# Patient Record
Sex: FEMALE | Race: WHITE | NOT HISPANIC OR LATINO | Employment: OTHER | URBAN - METROPOLITAN AREA
[De-identification: names, ages, dates, MRNs, and addresses within clinical notes are randomized per-mention and may not be internally consistent; named-entity substitution may affect disease eponyms.]

---

## 2018-04-11 LAB
BASOPHILS # BLD AUTO: 0 X3/UL (ref 0–0.3)
BASOPHILS # BLD AUTO: 0.7 % (ref 0–2)
DEPRECATED RDW RBC AUTO: 18.6 %
EOSINOPHIL # BLD AUTO: 0.4 X3/UL (ref 0–0.5)
EOSINOPHIL NFR BLD AUTO: 7 % (ref 0–5)
HCT VFR BLD AUTO: 35.4 % (ref 37–47)
HGB BLD-MCNC: 12.2 G/DL (ref 12–16)
LYMPHOCYTES # BLD AUTO: 1 X3/UL (ref 1.2–4.2)
LYMPHOCYTES NFR BLD AUTO: 17.6 % (ref 20.5–51.1)
MCH RBC QN AUTO: 33.2 PG (ref 26–34)
MCHC RBC AUTO-ENTMCNC: 34.3 G/DL (ref 31–37)
MCV RBC AUTO: 97 FL (ref 81–99)
MONOCYTES # BLD AUTO: 0.5 X3/UL (ref 0–1)
MONOCYTES NFR BLD AUTO: 9.8 % (ref 1.7–12)
NEUTROPHILS # BLD AUTO: 3.6 X3/UL (ref 1.4–6.5)
NEUTS SEG NFR BLD AUTO: 64.9 % (ref 42.2–75.2)
PLATELET # BLD AUTO: 216 X3/UL (ref 130–400)
PMV BLD AUTO: 10.4 FL
RBC # BLD AUTO: 3.66 X6/UL (ref 3.9–5.2)
WBC # BLD AUTO: 5.5 X3/UL (ref 4.8–10.8)

## 2018-04-12 LAB
ALBUMIN SERPL BCP-MCNC: 4.3 G/DL (ref 3.5–5.7)
ALP SERPL-CCNC: 70 IU/L (ref 55–165)
ALT SERPL W P-5'-P-CCNC: 8 IU/L (ref 5–26)
ANION GAP SERPL CALCULATED.3IONS-SCNC: 12.7 MM/L
AST SERPL W P-5'-P-CCNC: 15 U/L (ref 7–26)
BILIRUB SERPL-MCNC: 1.4 MG/DL (ref 0.3–1)
BILIRUBIN DIRECT (HISTORICAL): 0.1 MG/DL (ref 0–0.2)
BUN SERPL-MCNC: 22 MG/DL (ref 7–25)
CALCIUM SERPL-MCNC: 9.2 MG/DL (ref 8.6–10.5)
CHLORIDE SERPL-SCNC: 102 MM/L (ref 98–107)
CHOLEST SERPL-MCNC: 168 MG/DL (ref 0–200)
CO2 SERPL-SCNC: 29 MM/L (ref 21–31)
CREAT SERPL-MCNC: 0.6 MG/DL (ref 0.6–1.2)
EGFR (HISTORICAL): > 60 GFR
EGFR AFRICAN AMERICAN (HISTORICAL): > 60 GFR
EST. AVERAGE GLUCOSE BLD GHB EST-MCNC: 108 MG/DL
GLUCOSE (HISTORICAL): 100 MG/DL (ref 65–99)
HBA1C MFR BLD HPLC: 5.4 % (ref 4–6.2)
HDLC SERPL-MCNC: 36 MG/DL (ref 40–60)
LDLC SERPL CALC-MCNC: 113.3 MG/DL (ref 75–193)
OSMOLALITY, SERUM (HISTORICAL): 281 MOSM (ref 262–291)
POTASSIUM SERPL-SCNC: 4.7 MM/L (ref 3.5–5.5)
SODIUM SERPL-SCNC: 139 MM/L (ref 134–143)
T3 SERPL-MCNC: 78.63 NG/DL (ref 87–178)
T4 TOTAL (HISTORICAL): 10.34 UG/DL (ref 6.1–12.2)
TOTAL PROTEIN (HISTORICAL): 7.1 G/DL (ref 6.4–8.9)
TRIGL SERPL-MCNC: 94 MG/DL (ref 44–166)
TSH SERPL DL<=0.05 MIU/L-ACNC: 1.71 UIU/M (ref 0.45–5.33)
VLDL CHOLESTEROL (HISTORICAL): 19 MG/DL (ref 5–51)

## 2018-06-27 PROBLEM — E78.00 HYPERCHOLESTEROLEMIA: Status: ACTIVE | Noted: 2018-06-27

## 2018-06-27 PROBLEM — E03.9 HYPOTHYROID: Status: ACTIVE | Noted: 2018-06-27

## 2018-06-27 RX ORDER — HYDROCODONE BITARTRATE AND ACETAMINOPHEN 7.5; 325 MG/1; MG/1
1 TABLET ORAL DAILY
COMMUNITY
End: 2018-07-02 | Stop reason: SDUPTHER

## 2018-06-27 RX ORDER — LEVOTHYROXINE SODIUM 0.1 MG/1
100 TABLET ORAL DAILY
COMMUNITY
End: 2018-07-11 | Stop reason: SDUPTHER

## 2018-06-30 ENCOUNTER — HOSPITAL ENCOUNTER (EMERGENCY)
Facility: HOSPITAL | Age: 83
Discharge: HOME | End: 2018-06-30
Attending: FAMILY MEDICINE | Admitting: FAMILY MEDICINE
Payer: MEDICARE

## 2018-06-30 VITALS
HEIGHT: 61 IN | HEART RATE: 81 BPM | SYSTOLIC BLOOD PRESSURE: 154 MMHG | DIASTOLIC BLOOD PRESSURE: 75 MMHG | TEMPERATURE: 97.2 F | OXYGEN SATURATION: 94 % | RESPIRATION RATE: 20 BRPM | BODY MASS INDEX: 29.27 KG/M2 | WEIGHT: 155 LBS

## 2018-06-30 DIAGNOSIS — S01.01XA LACERATION OF SCALP, INITIAL ENCOUNTER: Primary | ICD-10-CM

## 2018-06-30 PROCEDURE — 99283 EMERGENCY DEPT VISIT LOW MDM: CPT

## 2018-06-30 PROCEDURE — 90715 TDAP VACCINE 7 YRS/> IM: CPT | Performed by: FAMILY MEDICINE

## 2018-06-30 PROCEDURE — 90471 IMMUNIZATION ADMIN: CPT

## 2018-06-30 RX ORDER — LIDOCAINE HYDROCHLORIDE 10 MG/ML
INJECTION, SOLUTION EPIDURAL; INFILTRATION; INTRACAUDAL; PERINEURAL
Status: DISCONTINUED
Start: 2018-06-30 | End: 2018-06-30 | Stop reason: HOSPADM

## 2018-06-30 RX ORDER — GINSENG 100 MG
CAPSULE ORAL
Status: COMPLETED
Start: 2018-06-30 | End: 2018-06-30

## 2018-06-30 RX ORDER — LIDOCAINE HYDROCHLORIDE 10 MG/ML
5 INJECTION, SOLUTION EPIDURAL; INFILTRATION; INTRACAUDAL; PERINEURAL ONCE
Status: COMPLETED | OUTPATIENT
Start: 2018-06-30 | End: 2018-06-30

## 2018-06-30 RX ORDER — GINSENG 100 MG
1 CAPSULE ORAL ONCE
Status: COMPLETED | OUTPATIENT
Start: 2018-06-30 | End: 2018-06-30

## 2018-06-30 RX ADMIN — Medication 1 APPLICATION: at 10:55

## 2018-06-30 RX ADMIN — TETANUS TOXOID, REDUCED DIPHTHERIA TOXOID AND ACELLULAR PERTUSSIS VACCINE, ADSORBED 0.5 ML: 5; 2.5; 8; 8; 2.5 SUSPENSION INTRAMUSCULAR at 10:14

## 2018-06-30 RX ADMIN — LIDOCAINE HYDROCHLORIDE 5 ML: 10 INJECTION, SOLUTION EPIDURAL; INFILTRATION; INTRACAUDAL; PERINEURAL at 10:19

## 2018-06-30 RX ADMIN — BACITRACIN ZINC 1 APPLICATION: 500 OINTMENT TOPICAL at 10:55

## 2018-06-30 NOTE — ED PROVIDER NOTES
History  Chief Complaint   Patient presents with    Fall    Laceration       History provided by:  EMS personnel and patient  Laceration   Location:  Head/neck  Length:  1cm   Depth:  Cutaneous  Bleeding: controlled    Time since incident:  1 hour  Laceration mechanism:  Metal edge  Pain details:     Quality:  Dull    Severity:  No pain    Timing:  Constant    Progression:  Unchanged  Foreign body present:  No foreign bodies  Relieved by:  None tried  Worsened by:  Nothing  Ineffective treatments:  None tried  Tetanus status:  Out of date  Associated symptoms: no fever, no focal weakness, no numbness, no rash, no redness, no swelling and no streaking        Prior to Admission Medications   Prescriptions Last Dose Informant Patient Reported? Taking? HYDROcodone-acetaminophen (NORCO) 7 5-325 mg per tablet 6/29/2018 at Unknown time  Yes Yes   Sig: Take 1 tablet by mouth daily   levothyroxine 100 mcg tablet 6/30/2018 at Unknown time  Yes Yes   Sig: Take 100 mcg by mouth daily      Facility-Administered Medications: None       Past Medical History:   Diagnosis Date    Hypothyroid        Past Surgical History:   Procedure Laterality Date    ANKLE SURGERY Left 11/06/2006    ORIF HIP FRACTURE Left 11/2006       Family History   Problem Relation Age of Onset    Cancer Neg Hx     Breast cancer Neg Hx      I have reviewed and agree with the history as documented  Social History   Substance Use Topics    Smoking status: Never Smoker    Smokeless tobacco: Never Used    Alcohol use No        Review of Systems   Constitutional: Negative for fever  HENT: Negative  Respiratory: Negative  Cardiovascular: Negative  Gastrointestinal: Negative  Musculoskeletal: Negative  Skin: Negative  Negative for rash  Neurological: Negative  Negative for focal weakness  Physical Exam  Physical Exam   Constitutional: She is oriented to person, place, and time  She appears well-developed and well-nourished  HENT:   Head: Normocephalic  Mouth/Throat: No oropharyngeal exudate  Right parietal area : 1cm laceration    Eyes: Conjunctivae and EOM are normal  Pupils are equal, round, and reactive to light  Right eye exhibits no discharge  Left eye exhibits no discharge  No scleral icterus  Neck: Normal range of motion  Neck supple  Cardiovascular: Normal rate and regular rhythm  Pulmonary/Chest: Effort normal and breath sounds normal  No respiratory distress  She has no wheezes  Lymphadenopathy:     She has no cervical adenopathy  Neurological: She is alert and oriented to person, place, and time  Skin: Skin is warm and dry  Capillary refill takes less than 2 seconds  Psychiatric: She has a normal mood and affect  Her behavior is normal    Nursing note and vitals reviewed  Vital Signs  ED Triage Vitals [06/30/18 1000]   Temperature Pulse Respirations Blood Pressure SpO2   98 4 °F (36 9 °C) 80 18 167/56 94 %      Temp Source Heart Rate Source Patient Position - Orthostatic VS BP Location FiO2 (%)   Tympanic Monitor Sitting Left arm --      Pain Score       No Pain           Vitals:    06/30/18 1000   BP: 167/56   Pulse: 80   Patient Position - Orthostatic VS: Sitting       Visual Acuity      ED Medications  Medications   bacitracin topical ointment 1 small application (not administered)   lidocaine (PF) (XYLOCAINE-MPF) 1 % injection 5 mL (5 mL Infiltration Given 6/30/18 1019)   tetanus-diphtheria-acellular pertussis (BOOSTRIX) IM injection 0 5 mL (0 5 mL Intramuscular Given 6/30/18 1014)       Diagnostic Studies  Results Reviewed     None                 No orders to display              Procedures  Lac Repair  Date/Time: 6/30/2018 10:14 AM  Performed by: Emma Finn  Authorized by: Emma Finn   Consent: Verbal consent obtained    Risks and benefits: risks, benefits and alternatives were discussed  Consent given by: patient  Patient understanding: patient states understanding of the procedure being performed  Patient consent: the patient's understanding of the procedure matches consent given  Procedure consent: procedure consent matches procedure scheduled  Relevant documents: relevant documents present and verified  Test results: test results available and properly labeled  Site marked: the operative site was marked  Radiology Images displayed and confirmed  If images not available, report reviewed: imaging studies available  Required items: required blood products, implants, devices, and special equipment available  Patient identity confirmed: verbally with patient  Time out: Immediately prior to procedure a "time out" was called to verify the correct patient, procedure, equipment, support staff and site/side marked as required    Body area: head/neck  Location details: scalp  Laceration length: 1 cm  Foreign bodies: no foreign bodies  Tendon involvement: none  Nerve involvement: none  Vascular damage: no  Anesthesia: local infiltration    Anesthesia:  Local Anesthetic: lidocaine 1% without epinephrine  Anesthetic total: 5 mL    Sedation:  Patient sedated: no    Wound Dehiscence:  Superficial Wound Dehiscence: simple closure      Procedure Details:  Irrigation solution: saline  Amount of cleaning: standard  Debridement: none  Degree of undermining: none  Skin closure: 3-0 nylon  Number of sutures: 5  Technique: simple  Approximation: close  Approximation difficulty: simple  Dressing: antibiotic ointment and 4x4 sterile gauze  Patient tolerance: Patient tolerated the procedure well with no immediate complications             Phone Contacts  ED Phone Contact    ED Course                               MDM  Number of Diagnoses or Management Options  Risk of Complications, Morbidity, and/or Mortality  Presenting problems: low  Diagnostic procedures: low  Management options: low    Patient Progress  Patient progress: stable    CritCare Time    Disposition  Final diagnoses:   Laceration of scalp, initial encounter Time reflects when diagnosis was documented in both MDM as applicable and the Disposition within this note     Time User Action Codes Description Comment    6/30/2018 10:15 AM Kaley Berger Add [S01 [id number]] Laceration of scalp, initial encounter       ED Disposition     ED Disposition Condition Comment    Discharge  Delbert Lack discharge to home/self care  Condition at discharge: Stable        Follow-up Information     Follow up With Specialties Details Why Contact Info       For suture removal F/u with PCP in 8 days for suture removal           Patient's Medications   Discharge Prescriptions    No medications on file     No discharge procedures on file      ED Provider  Electronically Signed by           Keith Ortega MD  06/30/18 9570

## 2018-06-30 NOTE — ED NOTES
Called placed to [facility], attendant states that he will call back with  information        Zheng Fischer RN  06/30/18 9621

## 2018-06-30 NOTE — DISCHARGE INSTRUCTIONS
Laceration   AMBULATORY CARE:   A laceration  is an injury to the skin and the soft tissue underneath it  Lacerations happen when you are cut or hit by something  They can happen anywhere on the body  Common symptoms include the following:   · Injury or wound to skin and tissue of any shape size that looks like a cut, tear, or gash    · Edges of the wound may be close together or wide apart    · Pain, bleeding, bruising, or swelling    · Numbness around the wound    · Decreased movement in an area below the wound  Seek care immediately if:   · You have heavy bleeding or bleeding that does not stop after 10 minutes of holding firm, direct pressure over the wound  · Your wound opens up  Contact your healthcare provider if:   · You have a fever or chills  · Your laceration is red, warm, or swollen  · You have red streaks on your skin coming from your wound  · You have white or yellow drainage from the wound that smells bad  · You have pain that gets worse, even after treatment  · You have questions or concerns about your condition or care  Treatment for a laceration  includes care to stop any bleeding  Your healthcare provider will stop the bleeding by applying pressure to the wound  He may need to check your wound for foreign objects and clean it to decrease the chance of infection  Your laceration may be closed with stitches, staples, tissue glue, or medical strips  Ask your healthcare provider if you need a tetanus shot  Medicines:   · Prescription pain medicine  may be given  Ask how to take this medicine safely  · Antibiotics  help treat or prevent a bacterial infection  · Take your medicine as directed  Contact your healthcare provider if you think your medicine is not helping or if you have side effects  Tell him or her if you are allergic to any medicine  Keep a list of the medicines, vitamins, and herbs you take  Include the amounts, and when and why you take them   Bring the list or the pill bottles to follow-up visits  Carry your medicine list with you in case of an emergency  Care for your wound as directed:   · Do not get your wound wet  until your healthcare provider says it is okay  Do not soak your wound in water  Do not go swimming until your healthcare provider says it is okay  Carefully wash the wound with soap and water  Gently pat the area dry or allow it to air dry  · Change your bandages  when they get wet, dirty, or after washing  Apply new, clean bandages as directed  Do not apply elastic bandages or tape too tight  Do not put powders or lotions over your incision  · Apply antibiotic ointment as directed  Your healthcare provider may give you antibiotic ointment to put over your wound if you have stitches  If you have strips of tape over your incision, let them dry up and fall off on their own  If they do not fall off within 14 days, gently remove them  If you have glue over your wound, do not remove or pick at it  If your glue comes off, do not replace it with glue that you have at home  · Check your wound every day for signs of infection such as swelling, redness, or pus  Self-care:   · Apply ice  on your wound for 15 to 20 minutes every hour or as directed  Use an ice pack, or put crushed ice in a plastic bag  Cover it with a towel  Ice helps prevent tissue damage and decreases swelling and pain  · Use a splint as directed  A splint will decrease movement and stress on your wound  It may help it heal faster  A splint may be used for lacerations over joints or areas of your body that bend  Ask your healthcare provider how to apply and remove a splint  · Decrease scarring of your wound  by applying ointments as directed  Do not apply ointments until your healthcare provider says it is okay  You may need to wait until your wound is healed  Ask which ointment to buy and how often to use it   After your wound is healed, use sunscreen over the area when you are out in the sun  You should do this for at least 6 months to 1 year after your injury  Follow up with your healthcare provider as directed:  Write down your questions so you remember to ask them during your visits  © 2017 [employer] Information is for End User's use only and may not be sold, redistributed or otherwise used for commercial purposes  All illustrations and images included in CareNotes® are the copyrighted property of [employer]  or [employer]on [employer]  The above information is an  only  It is not intended as medical advice for individual conditions or treatments  Talk to your doctor, nurse or pharmacist before following any medical regimen to see if it is safe and effective for you

## 2018-07-02 DIAGNOSIS — G89.29 CHRONIC LOW BACK PAIN WITHOUT SCIATICA, UNSPECIFIED BACK PAIN LATERALITY: Primary | ICD-10-CM

## 2018-07-02 DIAGNOSIS — M54.50 CHRONIC LOW BACK PAIN WITHOUT SCIATICA, UNSPECIFIED BACK PAIN LATERALITY: Primary | ICD-10-CM

## 2018-07-02 RX ORDER — HYDROCODONE BITARTRATE AND ACETAMINOPHEN 7.5; 325 MG/1; MG/1
1 TABLET ORAL DAILY
Qty: 30 TABLET | Refills: 0 | Status: SHIPPED | OUTPATIENT
Start: 2018-07-02 | End: 2018-10-10 | Stop reason: SDUPTHER

## 2018-07-07 ENCOUNTER — APPOINTMENT (EMERGENCY)
Dept: CT IMAGING | Facility: HOSPITAL | Age: 83
End: 2018-07-07
Payer: MEDICARE

## 2018-07-07 ENCOUNTER — HOSPITAL ENCOUNTER (EMERGENCY)
Facility: HOSPITAL | Age: 83
Discharge: HOME | End: 2018-07-07
Payer: MEDICARE

## 2018-07-07 VITALS
DIASTOLIC BLOOD PRESSURE: 65 MMHG | HEART RATE: 73 BPM | WEIGHT: 154 LBS | TEMPERATURE: 98.4 F | RESPIRATION RATE: 18 BRPM | SYSTOLIC BLOOD PRESSURE: 151 MMHG | BODY MASS INDEX: 30.23 KG/M2 | OXYGEN SATURATION: 94 % | HEIGHT: 60 IN

## 2018-07-07 DIAGNOSIS — S22.000A COMPRESSION FRACTURE OF THORACIC VERTEBRA, CLOSED, INITIAL ENCOUNTER (HCC): Primary | ICD-10-CM

## 2018-07-07 DIAGNOSIS — I10 ESSENTIAL HYPERTENSION: ICD-10-CM

## 2018-07-07 DIAGNOSIS — S39.012A LUMBAR STRAIN, INITIAL ENCOUNTER: ICD-10-CM

## 2018-07-07 LAB
ALBUMIN SERPL BCP-MCNC: 3.6 G/DL (ref 3.5–5.7)
ALP SERPL-CCNC: 55 U/L (ref 55–165)
ALT SERPL W P-5'-P-CCNC: 7 U/L (ref 7–52)
ANION GAP SERPL CALCULATED.3IONS-SCNC: 5 MMOL/L (ref 4–13)
APTT PPP: 34 SECONDS (ref 24–36)
AST SERPL W P-5'-P-CCNC: 16 U/L (ref 13–39)
BASOPHILS # BLD AUTO: 0.1 THOUSANDS/ΜL (ref 0–0.1)
BASOPHILS NFR BLD AUTO: 1 % (ref 0–2)
BILIRUB SERPL-MCNC: 1 MG/DL (ref 0.2–1)
BUN SERPL-MCNC: 14 MG/DL (ref 7–25)
CALCIUM SERPL-MCNC: 9 MG/DL (ref 8.6–10.5)
CHLORIDE SERPL-SCNC: 102 MMOL/L (ref 98–107)
CO2 SERPL-SCNC: 29 MMOL/L (ref 21–31)
CREAT SERPL-MCNC: 0.56 MG/DL (ref 0.6–1.2)
EOSINOPHIL # BLD AUTO: 0.1 THOUSAND/ΜL (ref 0–0.61)
EOSINOPHIL NFR BLD AUTO: 2 % (ref 0–5)
ERYTHROCYTE [DISTWIDTH] IN BLOOD BY AUTOMATED COUNT: 17.5 % (ref 11.5–14.5)
GFR SERPL CREATININE-BSD FRML MDRD: 79 ML/MIN/1.73SQ M
GLUCOSE SERPL-MCNC: 102 MG/DL (ref 65–99)
HCT VFR BLD AUTO: 35.2 % (ref 34.8–46.1)
HGB BLD-MCNC: 12.3 G/DL (ref 12–16)
INR PPP: 1.07 (ref 0.9–1.5)
LYMPHOCYTES # BLD AUTO: 0.8 THOUSANDS/ΜL (ref 0.6–4.47)
LYMPHOCYTES NFR BLD AUTO: 11 % (ref 21–51)
MCH RBC QN AUTO: 33.8 PG (ref 26–34)
MCHC RBC AUTO-ENTMCNC: 34.9 G/DL (ref 31–37)
MCV RBC AUTO: 97 FL (ref 81–99)
MONOCYTES # BLD AUTO: 0.7 THOUSAND/ΜL (ref 0.17–1.22)
MONOCYTES NFR BLD AUTO: 9 % (ref 2–12)
NEUTROPHILS # BLD AUTO: 6 THOUSANDS/ΜL (ref 1.4–6.5)
NEUTS SEG NFR BLD AUTO: 78 % (ref 42–75)
NRBC BLD AUTO-RTO: 0 /100 WBCS
PLATELET # BLD AUTO: 254 THOUSANDS/UL (ref 149–390)
PMV BLD AUTO: 9 FL (ref 8.6–11.7)
POTASSIUM SERPL-SCNC: 4 MMOL/L (ref 3.5–5.5)
PROT SERPL-MCNC: 6.6 G/DL (ref 6.4–8.9)
PROTHROMBIN TIME: 12.4 SECONDS (ref 10.1–12.9)
RBC # BLD AUTO: 3.62 MILLION/UL (ref 3.9–5.2)
SODIUM SERPL-SCNC: 136 MMOL/L (ref 134–143)
WBC # BLD AUTO: 7.7 THOUSAND/UL (ref 4.8–10.8)

## 2018-07-07 PROCEDURE — 72131 CT LUMBAR SPINE W/O DYE: CPT

## 2018-07-07 PROCEDURE — 71250 CT THORAX DX C-: CPT

## 2018-07-07 PROCEDURE — 99284 EMERGENCY DEPT VISIT MOD MDM: CPT

## 2018-07-07 PROCEDURE — 85610 PROTHROMBIN TIME: CPT

## 2018-07-07 PROCEDURE — 96374 THER/PROPH/DIAG INJ IV PUSH: CPT

## 2018-07-07 PROCEDURE — 85730 THROMBOPLASTIN TIME PARTIAL: CPT

## 2018-07-07 PROCEDURE — 85025 COMPLETE CBC W/AUTO DIFF WBC: CPT

## 2018-07-07 PROCEDURE — 70450 CT HEAD/BRAIN W/O DYE: CPT

## 2018-07-07 PROCEDURE — 80053 COMPREHEN METABOLIC PANEL: CPT

## 2018-07-07 PROCEDURE — 36415 COLL VENOUS BLD VENIPUNCTURE: CPT

## 2018-07-07 PROCEDURE — 93005 ELECTROCARDIOGRAM TRACING: CPT

## 2018-07-07 RX ORDER — FENTANYL CITRATE 50 UG/ML
50 INJECTION, SOLUTION INTRAMUSCULAR; INTRAVENOUS ONCE
Status: COMPLETED | OUTPATIENT
Start: 2018-07-07 | End: 2018-07-07

## 2018-07-07 RX ORDER — HYDRALAZINE HYDROCHLORIDE 20 MG/ML
10 INJECTION INTRAMUSCULAR; INTRAVENOUS ONCE
Status: DISCONTINUED | OUTPATIENT
Start: 2018-07-07 | End: 2018-07-07 | Stop reason: HOSPADM

## 2018-07-07 RX ORDER — GABAPENTIN 300 MG/1
300 CAPSULE ORAL 2 TIMES DAILY
Qty: 14 CAPSULE | Refills: 0 | Status: SHIPPED | OUTPATIENT
Start: 2018-07-07 | End: 2018-07-11 | Stop reason: SDUPTHER

## 2018-07-07 RX ADMIN — FENTANYL CITRATE 50 MCG: 50 INJECTION INTRAMUSCULAR; INTRAVENOUS at 13:42

## 2018-07-07 NOTE — DISCHARGE INSTRUCTIONS
Chronic Hypertension   AMBULATORY CARE:   Hypertension  is high blood pressure (BP)  Your BP is the force of your blood moving against the walls of your arteries  Normal BP is less than 120/80  Prehypertension is between 120/80 and 139/89  Hypertension is 140/90 or higher  Hypertension causes your BP to get so high that your heart has to work much harder than normal  This can damage your heart  Chronic hypertension is a long-term condition that you can control with a healthy lifestyle or medicines  A controlled blood pressure helps protect your organs, such as your heart, lungs, brain, and kidneys  Common symptoms include the following:   · Headache     · Blurred vision    · Chest pain     · Dizziness or weakness     · Trouble breathing     · Nosebleeds  Call 911 for any of the following:   · You have discomfort in your chest that feels like squeezing, pressure, fullness, or pain  · You become confused or have difficulty speaking  · You suddenly feel lightheaded or have trouble breathing  · You have pain or discomfort in your back, neck, jaw, stomach, or arm  Seek care immediately if:   · You have a severe headache or vision loss  · You have weakness in an arm or leg  Contact your healthcare provider if:   · You feel faint, dizzy, confused, or drowsy  · You have been taking your BP medicine and your BP is still higher than your healthcare provider says it should be  · You have questions or concerns about your condition or care  Treatment for chronic hypertension  may include medicine to lower your BP and lower your cholesterol level  A low cholesterol level helps prevent heart disease and makes it easier to control your blood pressure  Heart disease can make your blood pressure harder to control  You may also need to make lifestyle changes  Take your medicine exactly as directed    Manage chronic hypertension:  Talk with your healthcare provider about these and other ways to manage hypertension:  · Take your BP at home  Sit and rest for 5 minutes before you take your BP  Extend your arm and support it on a flat surface  Your arm should be at the same level as your heart  Follow the directions that came with your BP monitor  If possible, take at least 2 BP readings each time  Take your BP at least twice a day at the same times each day, such as morning and evening  Keep a record of your BP readings and bring it to your follow-up visits  Ask your healthcare provider what your blood pressure should be  · Limit sodium (salt) as directed  Too much sodium can affect your fluid balance  Check labels to find low-sodium or no-salt-added foods  Some low-sodium foods use potassium salts for flavor  Too much potassium can also cause health problems  Your healthcare provider will tell you how much sodium and potassium are safe for you to have in a day  He or she may recommend that you limit sodium to 2,300 mg a day  · Follow the meal plan recommended by your healthcare provider  A dietitian or your provider can give you more information on low-sodium plans or the DASH (Dietary Approaches to Stop Hypertension) eating plan  The DASH plan is low in sodium, unhealthy fats, and total fat  It is high in potassium, calcium, and fiber  · Exercise to maintain a healthy weight  Exercise at least 30 minutes per day, on most days of the week  This will help decrease your blood pressure  Ask about the best exercise plan for you  · Decrease stress  This may help lower your BP  Learn ways to relax, such as deep breathing or listening to music  · Limit alcohol  Women should limit alcohol to 1 drink a day  Men should limit alcohol to 2 drinks a day  A drink of alcohol is 12 ounces of beer, 5 ounces of wine, or 1½ ounces of liquor  · Do not smoke  Nicotine and other chemicals in cigarettes and cigars can increase your BP and also cause lung damage   Ask your healthcare provider for information if you currently smoke and need help to quit  E-cigarettes or smokeless tobacco still contain nicotine  Talk to your healthcare provider before you use these products  Follow up with your healthcare provider as directed: You will need to return to have your BP checked and to have other lab tests done  Write down your questions so you remember to ask them during your visits  © 2017 [employer] [employer] Information is for End User's use only and may not be sold, redistributed or otherwise used for commercial purposes  All illustrations and images included in CareNotes® are the copyrighted property of [employer]  or [employer]  The above information is an  only  It is not intended as medical advice for individual conditions or treatments  Talk to your doctor, nurse or pharmacist before following any medical regimen to see if it is safe and effective for you  Chronic Hypertension   WHAT YOU NEED TO KNOW:   Hypertension is high blood pressure (BP)  Your BP is the force of your blood moving against the walls of your arteries  Normal BP is less than 120/80  Prehypertension is between 120/80 and 139/89  Hypertension is 140/90 or higher  Hypertension causes your BP to get so high that your heart has to work much harder than normal  This can damage your heart  Chronic hypertension is a long-term condition that you can control with a healthy lifestyle or medicines  A controlled blood pressure helps protect your organs, such as your heart, lungs, brain, and kidneys  DISCHARGE INSTRUCTIONS:   Call 911 for any of the following:   · You have discomfort in your chest that feels like squeezing, pressure, fullness, or pain  · You become confused or have difficulty speaking  · You suddenly feel lightheaded or have trouble breathing  · You have pain or discomfort in your back, neck, jaw, stomach, or arm    Return to the emergency department if:   · You have a severe headache or vision loss  · You have weakness in an arm or leg  Contact your healthcare provider if:   · You feel faint, dizzy, confused, or drowsy  · You have been taking your BP medicine and your BP is still higher than your healthcare provider says it should be  · You have questions or concerns about your condition or care  Medicines: You may need any of the following:  · Medicine  may be used to help lower your BP  You may need more than one type of medicine  Take the medicine exactly as directed  · Diuretics  help decrease extra fluid that collects in your body  This will help lower your BP  You may urinate more often while you take this medicine  · Cholesterol medicine  helps lower your cholesterol level  A low cholesterol level helps prevent heart disease and makes it easier to control your blood pressure  · Take your medicine as directed  Contact your healthcare provider if you think your medicine is not helping or if you have side effects  Tell him or her if you are allergic to any medicine  Keep a list of the medicines, vitamins, and herbs you take  Include the amounts, and when and why you take them  Bring the list or the pill bottles to follow-up visits  Carry your medicine list with you in case of an emergency  Follow up with your healthcare provider as directed: You will need to return to have your blood pressure checked and to have other lab tests done  Write down your questions so you remember to ask them during your visits  Manage chronic hypertension:  Talk with your healthcare provider about these and other ways to manage hypertension:  · Take your BP at home  Sit and rest for 5 minutes before you take your BP  Extend your arm and support it on a flat surface  Your arm should be at the same level as your heart  Follow the directions that came with your BP monitor  If possible, take at least 2 BP readings each time   Take your BP at least twice a day at the same times each day, such as morning and evening  Keep a record of your BP readings and bring it to your follow-up visits  Ask your healthcare provider what your blood pressure should be  · Limit sodium (salt) as directed  Too much sodium can affect your fluid balance  Check labels to find low-sodium or no-salt-added foods  Some low-sodium foods use potassium salts for flavor  Too much potassium can also cause health problems  Your healthcare provider will tell you how much sodium and potassium are safe for you to have in a day  He or she may recommend that you limit sodium to 2,300 mg a day  · Follow the meal plan recommended by your healthcare provider  A dietitian or your provider can give you more information on low-sodium plans or the DASH (Dietary Approaches to Stop Hypertension) eating plan  The DASH plan is low in sodium, unhealthy fats, and total fat  It is high in potassium, calcium, and fiber  · Exercise to maintain a healthy weight  Exercise at least 30 minutes per day, on most days of the week  This will help decrease your blood pressure  Ask about the best exercise plan for you  · Decrease stress  This may help lower your BP  Learn ways to relax, such as deep breathing or listening to music  · Limit alcohol  Women should limit alcohol to 1 drink a day  Men should limit alcohol to 2 drinks a day  A drink of alcohol is 12 ounces of beer, 5 ounces of wine, or 1½ ounces of liquor  · Do not smoke  Nicotine and other chemicals in cigarettes and cigars can increase your BP and also cause lung damage  Ask your healthcare provider for information if you currently smoke and need help to quit  E-cigarettes or smokeless tobacco still contain nicotine  Talk to your healthcare provider before you use these products  © 2017 [employer] [employer] Information is for End User's use only and may not be sold, redistributed or otherwise used for commercial purposes   All illustrations and images included in CareNotes® are the copyrighted property of [employer]  or [employer]on Fisher  The above information is an  only  It is not intended as medical advice for individual conditions or treatments  Talk to your doctor, nurse or pharmacist before following any medical regimen to see if it is safe and effective for you  Low Back Strain   WHAT YOU NEED TO KNOW:   Low back strain is an injury to your lower back muscles or tendons  Tendons are strong tissues that connect muscles to bones  The lower back supports most of your body weight and helps you move, twist, and bend  DISCHARGE INSTRUCTIONS:   Return to the emergency department if:   · You hear or feel a pop in your lower back  · You have increased swelling or pain in your lower back  · You have trouble moving your legs  · Your legs are numb  Contact your healthcare provider if:   · You have a fever  · Your pain does not go away, even after treatment  · You have questions or concerns about your condition or care  Medicines: The following medicines may be ordered by your healthcare provider:  · Acetaminophen decreases pain and fever  It is available without a doctor's order  Ask how much to take and how often to take it  Follow directions  Acetaminophen can cause liver damage if not taken correctly  · NSAIDs , such as ibuprofen, help decrease swelling, pain, and fever  This medicine is available with or without a doctor's order  NSAIDs can cause stomach bleeding or kidney problems in certain people  If you take blood thinner medicine, always ask your healthcare provider if NSAIDs are safe for you  Always read the medicine label and follow directions  · Muscle relaxers  help decrease pain and muscle spasms  · Prescription pain medicine  may be given  Ask how to take this medicine safely  · Take your medicine as directed    Contact your healthcare provider if you think your medicine is not helping or if you have side effects  Tell him or her if you are allergic to any medicine  Keep a list of the medicines, vitamins, and herbs you take  Include the amounts, and when and why you take them  Bring the list or the pill bottles to follow-up visits  Carry your medicine list with you in case of an emergency  Self-care:   · Rest  as directed  You may need to rest in bed for a period of time after your injury  Do not lift heavy objects  · Apply ice  on your back for 15 to 20 minutes every hour or as directed  Use an ice pack, or put crushed ice in a plastic bag  Cover it with a towel  Ice helps prevent tissue damage and decreases swelling and pain  · Apply heat  on your lower back for 20 to 30 minutes every 2 hours for as many days as directed  Heat helps decrease pain and muscle spasms  · Slowly start to increase your activity  as the pain decreases, or as directed  Prevent another low back strain:   · Use correct body movements  ¨ Bend at the hips and knees when you  objects  Do not bend from the waist  Use your leg muscles as you lift the load  Do not use your back  Keep the object close to your chest as you lift it  Try not to twist or lift anything above your waist     ¨ Change your position often when you stand for long periods of time  Rest one foot on a small box or footrest, and then switch to the other foot often  ¨ Try not to sit for long periods of time  When you do, sit in a straight-backed chair with your feet flat on the floor  ¨ Never reach, pull, or push while you are sitting  · Warm up before you exercise  Do exercises that strengthen your back muscles  Ask your healthcare provider about the best exercise plan for you  · Maintain a healthy weight  Ask your healthcare provider how much you should weigh  Ask him to help you create a weight loss plan if you are overweight    © 2017 [address] [employer] Information is for End User's use only and may not be sold, redistributed or otherwise used for commercial purposes  All illustrations and images included in CareNotes® are the copyrighted property of [employer]  or [employer] Fisher  The above information is an  only  It is not intended as medical advice for individual conditions or treatments  Talk to your doctor, nurse or pharmacist before following any medical regimen to see if it is safe and effective for you  Low Back Strain   AMBULATORY CARE:   Low back strain  is an injury to your lower back muscles or tendons  Tendons are strong tissues that connect muscles to bones  The lower back supports most of your body weight and helps you move, twist, and bend  Low back strain is usually caused by activities that increase stress on the lower back, such as exercise or injury  Common signs and symptoms include the following:   · Low back pain or muscle spasms    · Stiffness or limited movement    · Pain that goes down to the buttocks, groin, or legs    · Pain that is worse with activity  Seek care immediately if:   · You hear or feel a pop in your lower back  · You have increased swelling or pain in your lower back  · You have trouble moving your legs  · Your legs are numb  Contact your healthcare provider if:   · You have a fever  · Your pain does not go away, even after treatment  · You have questions or concerns about your condition or care  Treatment for low back strain:   · Acetaminophen decreases pain and fever  It is available without a doctor's order  Ask how much to take and how often to take it  Follow directions  Acetaminophen can cause liver damage if not taken correctly  · NSAIDs , such as ibuprofen, help decrease swelling, pain, and fever  This medicine is available with or without a doctor's order  NSAIDs can cause stomach bleeding or kidney problems in certain people   If you take blood thinner medicine, always ask your healthcare provider if NSAIDs are safe for you  Always read the medicine label and follow directions  · Muscle relaxers  help decrease pain and muscle spasms  · Prescription pain medicine  may be given  Ask how to take this medicine safely  · Surgery  may be needed if your strain is severe  Manage your symptoms:   · Rest  as directed  You may need to rest in bed for a period of time after your injury  Do not lift heavy objects  · Apply ice  on your back for 15 to 20 minutes every hour or as directed  Use an ice pack, or put crushed ice in a plastic bag  Cover it with a towel  Ice helps prevent tissue damage and decreases swelling and pain  · Apply heat  on your lower back for 20 to 30 minutes every 2 hours for as many days as directed  Heat helps decrease pain and muscle spasms  · Slowly start to increase your activity  as the pain decreases, or as directed  Prevent another low back strain:   · Use correct body movements  ¨ Bend at the hips and knees when you  objects  Do not bend from the waist  Use your leg muscles as you lift the load  Do not use your back  Keep the object close to your chest as you lift it  Try not to twist or lift anything above your waist     ¨ Change your position often when you stand for long periods of time  Rest one foot on a small box or footrest, and then switch to the other foot often  ¨ Try not to sit for long periods of time  When you do, sit in a straight-backed chair with your feet flat on the floor  ¨ Never reach, pull, or push while you are sitting  · Warm up before you exercise  Do exercises that strengthen your back muscles  Ask your healthcare provider about the best exercise plan for you  · Maintain a healthy weight  Ask your healthcare provider how much you should weigh  Ask him to help you create a weight loss plan if you are overweight    Follow up with your healthcare provider as directed:  Write down your questions so you remember to ask them during your visits  © 2017 [address] [employer] Information is for End User's use only and may not be sold, redistributed or otherwise used for commercial purposes  All illustrations and images included in CareNotes® are the copyrighted property of [employer]  or Lavon Fisher  The above information is an  only  It is not intended as medical advice for individual conditions or treatments  Talk to your doctor, nurse or pharmacist before following any medical regimen to see if it is safe and effective for you  Low Back Strain   WHAT YOU NEED TO KNOW:   Low back strain is an injury to your lower back muscles or tendons  Tendons are strong tissues that connect muscles to bones  The lower back supports most of your body weight and helps you move, twist, and bend  DISCHARGE INSTRUCTIONS:   Seek care immediately if:   · You hear or feel a pop in your lower back  · You have increased swelling or pain in your lower back  · You have trouble moving your legs  · Your legs are numb  Contact your healthcare provider if:   · You have a fever  · Your pain does not go away, even after treatment  · You have questions or concerns about your condition or care  Medicines: The following medicines may be ordered by your healthcare provider:  · Acetaminophen decreases pain and fever  It is available without a doctor's order  Ask how much to take and how often to take it  Follow directions  Acetaminophen can cause liver damage if not taken correctly  · NSAIDs , such as ibuprofen, help decrease swelling, pain, and fever  This medicine is available with or without a doctor's order  NSAIDs can cause stomach bleeding or kidney problems in certain people  If you take blood thinner medicine, always ask your healthcare provider if NSAIDs are safe for you  Always read the medicine label and follow directions      · Muscle relaxers  help decrease pain and muscle spasms  · Prescription pain medicine  may be given  Ask how to take this medicine safely  · Take your medicine as directed  Contact your healthcare provider if you think your medicine is not helping or if you have side effects  Tell him or her if you are allergic to any medicine  Keep a list of the medicines, vitamins, and herbs you take  Include the amounts, and when and why you take them  Bring the list or the pill bottles to follow-up visits  Carry your medicine list with you in case of an emergency  Self-care:   · Rest  as directed  You may need to rest in bed for a period of time after your injury  Do not lift heavy objects  · Apply ice  on your back for 15 to 20 minutes every hour or as directed  Use an ice pack, or put crushed ice in a plastic bag  Cover it with a towel  Ice helps prevent tissue damage and decreases swelling and pain  · Apply heat  on your lower back for 20 to 30 minutes every 2 hours for as many days as directed  Heat helps decrease pain and muscle spasms  · Slowly start to increase your activity  as the pain decreases, or as directed  Prevent another low back strain:   · Use correct body movements  ¨ Bend at the hips and knees when you  objects  Do not bend from the waist  Use your leg muscles as you lift the load  Do not use your back  Keep the object close to your chest as you lift it  Try not to twist or lift anything above your waist     ¨ Change your position often when you stand for long periods of time  Rest one foot on a small box or footrest, and then switch to the other foot often  ¨ Try not to sit for long periods of time  When you do, sit in a straight-backed chair with your feet flat on the floor  ¨ Never reach, pull, or push while you are sitting  · Warm up before you exercise  Do exercises that strengthen your back muscles  Ask your healthcare provider about the best exercise plan for you  · Maintain a healthy weight    Ask your healthcare provider how much you should weigh  Ask him to help you create a weight loss plan if you are overweight  Follow up with your healthcare provider as directed:  Write down your questions so you remember to ask them during your visits  © 2017 [address] [employer] Information is for End User's use only and may not be sold, redistributed or otherwise used for commercial purposes  All illustrations and images included in CareNotes® are the copyrighted property of [employer]  or [employer]on Fisher  The above information is an  only  It is not intended as medical advice for individual conditions or treatments  Talk to your doctor, nurse or pharmacist before following any medical regimen to see if it is safe and effective for you  Low Back Strain   WHAT YOU NEED TO KNOW:   What is low back strain? Low back strain is an injury to your lower back muscles or tendons  Tendons are strong tissues that connect muscles to bones  The lower back supports most of your body weight and helps you move, twist, and bend  What causes low back strain? Low back strain is usually caused by activities that increase stress on the lower back, such as exercise or injury  The following may increase your risk for low back strain:  · You have had low back strain before  · You lift heavy objects with your back instead of your legs  · You do not warm up before you exercise  · You sit or stand for long periods of time  · You are overweight  What are the signs and symptoms of low back strain? · Low back pain or muscle spasms           · Stiffness or limited movement    · Pain that goes down to the buttocks, groin, or legs    · Pain that is worse with activity  How is low back strain diagnosed? An x-ray, CT scan, or MRI may be done to check for damage to your spine, muscles, or tendons  You may be given contrast liquid to help the tissues in your lower back show up better in the pictures  Tell the healthcare provider if you have ever had an allergic reaction to contrast liquid  Do not enter the MRI room with anything metal  Metal can cause serious injury  Tell the healthcare provider if you have any metal in or on your body  How is low back strain treated? · Acetaminophen decreases pain and fever  It is available without a doctor's order  Ask how much to take and how often to take it  Follow directions  Acetaminophen can cause liver damage if not taken correctly  · NSAIDs , such as ibuprofen, help decrease swelling, pain, and fever  This medicine is available with or without a doctor's order  NSAIDs can cause stomach bleeding or kidney problems in certain people  If you take blood thinner medicine, always ask your healthcare provider if NSAIDs are safe for you  Always read the medicine label and follow directions  · Muscle relaxers  help decrease pain and muscle spasms  · Prescription pain medicine  may be given  Ask how to take this medicine safely  · Surgery  may be needed if your strain is severe  How can I manage my symptoms? · Rest  as directed  You may need to rest in bed for a period of time after your injury  Do not lift heavy objects  · Apply ice  on your back for 15 to 20 minutes every hour or as directed  Use an ice pack, or put crushed ice in a plastic bag  Cover it with a towel  Ice helps prevent tissue damage and decreases swelling and pain  · Apply heat  on your lower back for 20 to 30 minutes every 2 hours for as many days as directed  Heat helps decrease pain and muscle spasms  · Slowly start to increase your activity  as the pain decreases, or as directed  How can low back strain be prevented? · Use correct body movements  ¨ Bend at the hips and knees when you  objects  Do not bend from the waist  Use your leg muscles as you lift the load  Do not use your back  Keep the object close to your chest as you lift it   Try not to twist or lift anything above your waist     ¨ Change your position often when you stand for long periods of time  Rest one foot on a small box or footrest, and then switch to the other foot often  ¨ Try not to sit for long periods of time  When you do, sit in a straight-backed chair with your feet flat on the floor  ¨ Never reach, pull, or push while you are sitting  · Warm up before you exercise  Do exercises that strengthen your back muscles  Ask your healthcare provider about the best exercise plan for you  · Maintain a healthy weight  Ask your healthcare provider how much you should weigh  Ask him to help you create a weight loss plan if you are overweight  When should I seek immediate care? · You hear or feel a pop in your lower back  · You have increased swelling or pain in your lower back  · You have trouble moving your legs  · Your legs are numb  When should I contact my healthcare provider? · You have a fever  · Your pain does not go away, even after treatment  · You have questions or concerns about your condition or care  CARE AGREEMENT:   You have the right to help plan your care  Learn about your health condition and how it may be treated  Discuss treatment options with your caregivers to decide what care you want to receive  You always have the right to refuse treatment  The above information is an  only  It is not intended as medical advice for individual conditions or treatments  Talk to your doctor, nurse or pharmacist before following any medical regimen to see if it is safe and effective for you  © 2017 [address] Information is for End User's use only and may not be sold, redistributed or otherwise used for commercial purposes  All illustrations and images included in CareNotes® are the copyrighted property of [employer]  or [employer]on Fisher      Vertebral Compression Fracture   WHAT YOU NEED TO KNOW:   A vertebral compression fracture (VCF) is a break in a part of the vertebra  Vertebrae are the round, strong bones that form your spine  VCFs most often occur in the thoracic (middle) and lumbar (lower) areas of your spine  Fractures may be mild to severe  DISCHARGE INSTRUCTIONS:   Medicines: You may need any of the following:  · NSAIDs , such as ibuprofen, help decrease swelling, pain, and fever  This medicine is available with or without a doctor's order  NSAIDs can cause stomach bleeding or kidney problems in certain people  If you take blood thinner medicine, always ask if NSAIDs are safe for you  Always read the medicine label and follow directions  Do not give these medicines to children under 10months of age without direction from your child's healthcare provider  · Acetaminophen  decreases pain and fever  It is available without a doctor's order  Ask how much to take and how often to take it  Follow directions  Acetaminophen can cause liver damage if not taken correctly  · Prescription pain medicine  may be given  Ask your healthcare provider how to take this medicine safely  · Bisphosphonates and calcitonin  may be recommended to help your bones get stronger  They can decrease the pain of a VCF caused by osteoporosis, and decrease your risk for another fracture  · Take your medicine as directed  Contact your healthcare provider if you think your medicine is not helping or if you have side effects  Tell him or her if you are allergic to any medicine  Keep a list of the medicines, vitamins, and herbs you take  Include the amounts, and when and why you take them  Bring the list or the pill bottles to follow-up visits  Carry your medicine list with you in case of an emergency  Follow up with your healthcare provider as directed: You may need to return for x-rays or other tests  Write down your questions so you remember to ask them during your visits     Heat and ice:   · Apply ice  on your back for 15 to 20 minutes every hour or as directed  Use an ice pack, or put crushed ice in a plastic bag  Cover it with a towel  Ice helps prevent tissue damage and decreases swelling and pain  · Apply heat  on your back for 20 to 30 minutes every 2 hours for as many days as directed  Heat helps decrease pain and muscle spasms  Activity:   · Avoid activities that may make the pain worse, such as picking up heavy objects  When the pain decreases, begin normal, slow movements as directed by your healthcare provider  Your healthcare provider may have you do weight-bearing exercises such as walking  You may also do non-weight-bearing exercises such as swimming and bicycling  · You may need to use a walker or cane  Ask your healthcare provider for more information about how to use a cane or a walker  · When you  objects, bend at the hips and knees  Never bend from the waist only  Use bent knees and your leg muscles as you lift the object  While you lift the object, keep it close to your chest  Try not to twist or lift anything above your waist   Physical and occupational therapy:  Your healthcare provider may recommend physical and occupational therapy  A physical therapist teaches you exercises to help improve movement and strength, and to decrease pain  An occupational therapist teaches you skills to help with your daily activities  Manage pain during sleep:   · Do not sleep on a waterbed  Waterbeds do not provide good back support  · Sleep on a firm mattress  You may also put a ½ to 1-inch piece of plywood between the mattress and box spring  · Sleep on your back with a pillow under your knees  This will decrease pressure on your back  You may also sleep on your side with 1 or both of your knees bent and a pillow between them  It may also be helpful to sleep on your stomach with a pillow under you at waist level  Contact your healthcare provider if:   · You are not hungry, and you are losing weight      · You cannot sleep or rest because of back pain  · You have pain or swelling in your back that is getting worse, or does not go away  · You have questions or concerns about your condition or care  Return to the emergency department if:   · You feel lightheaded, short of breath, and have chest pain  · You cough up blood  · Your arm or leg feels warm, tender, and painful  It may look swollen and red  · You have new problems urinating or having bowel movements  · You have severe pain in your back after falling, bending forward, sneezing, or coughing strongly  · You suddenly cannot feel your legs  · You suddenly have trouble moving your arms or legs  © 2017 [address] Information is for End User's use only and may not be sold, redistributed or otherwise used for commercial purposes  All illustrations and images included in CareNotes® are the copyrighted property of [employer]  or Lavon Fisher  The above information is an  only  It is not intended as medical advice for individual conditions or treatments  Talk to your doctor, nurse or pharmacist before following any medical regimen to see if it is safe and effective for you  Vertebral Compression Fracture   WHAT YOU NEED TO KNOW:   A vertebral compression fracture (VCF) is a break in a part of the vertebra  Vertebrae are the round, strong bones that form your spine  VCFs most often occur in the thoracic (middle) and lumbar (lower) areas of your spine  Fractures may be mild to severe  DISCHARGE INSTRUCTIONS:   Medicines: You may need any of the following:  · NSAIDs , such as ibuprofen, help decrease swelling, pain, and fever  This medicine is available with or without a doctor's order  NSAIDs can cause stomach bleeding or kidney problems in certain people  If you take blood thinner medicine, always ask if NSAIDs are safe for you  Always read the medicine label and follow directions   Do not give these medicines to children under 10months of age without direction from your child's healthcare provider  · Acetaminophen  decreases pain and fever  It is available without a doctor's order  Ask how much to take and how often to take it  Follow directions  Acetaminophen can cause liver damage if not taken correctly  · Prescription pain medicine  may be given  Ask your healthcare provider how to take this medicine safely  · Bisphosphonates and calcitonin  may be recommended to help your bones get stronger  They can decrease the pain of a VCF caused by osteoporosis, and decrease your risk for another fracture  · Take your medicine as directed  Contact your healthcare provider if you think your medicine is not helping or if you have side effects  Tell him or her if you are allergic to any medicine  Keep a list of the medicines, vitamins, and herbs you take  Include the amounts, and when and why you take them  Bring the list or the pill bottles to follow-up visits  Carry your medicine list with you in case of an emergency  Follow up with your healthcare provider as directed: You may need to return for x-rays or other tests  Write down your questions so you remember to ask them during your visits  Heat and ice:   · Apply ice  on your back for 15 to 20 minutes every hour or as directed  Use an ice pack, or put crushed ice in a plastic bag  Cover it with a towel  Ice helps prevent tissue damage and decreases swelling and pain  · Apply heat  on your back for 20 to 30 minutes every 2 hours for as many days as directed  Heat helps decrease pain and muscle spasms  Activity:   · Avoid activities that may make the pain worse, such as picking up heavy objects  When the pain decreases, begin normal, slow movements as directed by your healthcare provider  Your healthcare provider may have you do weight-bearing exercises such as walking  You may also do non-weight-bearing exercises such as swimming and bicycling  · You may need to use a walker or cane  Ask your healthcare provider for more information about how to use a cane or a walker  · When you  objects, bend at the hips and knees  Never bend from the waist only  Use bent knees and your leg muscles as you lift the object  While you lift the object, keep it close to your chest  Try not to twist or lift anything above your waist   Physical and occupational therapy:  Your healthcare provider may recommend physical and occupational therapy  A physical therapist teaches you exercises to help improve movement and strength, and to decrease pain  An occupational therapist teaches you skills to help with your daily activities  Manage pain during sleep:   · Do not sleep on a waterbed  Waterbeds do not provide good back support  · Sleep on a firm mattress  You may also put a ½ to 1-inch piece of plywood between the mattress and box spring  · Sleep on your back with a pillow under your knees  This will decrease pressure on your back  You may also sleep on your side with 1 or both of your knees bent and a pillow between them  It may also be helpful to sleep on your stomach with a pillow under you at waist level  Contact your healthcare provider if:   · You are not hungry, and you are losing weight  · You cannot sleep or rest because of back pain  · You have pain or swelling in your back that is getting worse, or does not go away  · You have questions or concerns about your condition or care  Seek care immediately or call 911 if:   · You feel lightheaded, short of breath, and have chest pain  · You cough up blood  · Your arm or leg feels warm, tender, and painful  It may look swollen and red  · You have new problems urinating or having bowel movements  · You have severe pain in your back after falling, bending forward, sneezing, or coughing strongly  · You suddenly cannot feel your legs      · You suddenly have trouble moving your arms or legs   © 2017 [facility] Information is for End User's use only and may not be sold, redistributed or otherwise used for commercial purposes  All illustrations and images included in CareNotes® are the copyrighted property of [employer]  or Lavon Fisher  The above information is an  only  It is not intended as medical advice for individual conditions or treatments  Talk to your doctor, nurse or pharmacist before following any medical regimen to see if it is safe and effective for you  Vertebral Compression Fracture   WHAT YOU NEED TO KNOW:   What is a vertebral compression fracture? A vertebral compression fracture (VCF) is a break in a part of the vertebra  Vertebrae are the round, strong bones that form your spine  VCFs most often occur in the thoracic (middle) and lumbar (lower) areas of your spine  Fractures may be mild to severe  What causes a VCF?   · Osteoporosis  is a condition that causes your bones to become weak and brittle  People with weak bones can get fractures by bending forward, standing from a seated position, sneezing, or strong coughing  · Injuries  to the spine can occur during a vehicle accident, a fall, or while playing sports  · Diseases  of the spine, such as cancer, infection, and avascular necrosis, can weaken your bones and cause fractures  What are the signs and symptoms of a VCF? You may not have any signs and symptoms with a mild VCF, or you may have any of the following:  · Sudden, severe, and sharp back pain    · Back pain that gets worse when you stand or walk    · Muscle spasms in your back     · Problems urinating or having bowel movements    · Sudden weakness in your arms or legs  How is a VCF diagnosed? Your healthcare provider will ask you about injuries and diseases you have had in the past  Your healthcare provider will do a physical exam, and check your spine   You may need any of the following tests:  · X-rays, a CT scan, or MRI  of your spine may be taken  You may be given a dye before the pictures are taken to help healthcare providers see the pictures better  Tell the healthcare provider if you have ever had an allergic reaction to contrast dye  You should not enter the MRI room with anything metal  Metal can cause serious injury  Tell the healthcare provider if you have any metal in or on your body  · A bone scan  of your spine may be done  The pictures may show broken bones or other problems in the spine, such as an infection  How is a VCF treated? If you have a mild fracture, you may need to rest in bed for a short time  You may need to wear a back brace for 8 to 12 weeks  A brace may decrease your pain, and help your vertebrae heal  You may need to use a cane or walker  You may also need any of the following:  · Medicines:     ¨ NSAIDs , such as ibuprofen, help decrease swelling, pain, and fever  This medicine is available with or without a doctor's order  NSAIDs can cause stomach bleeding or kidney problems in certain people  If you take blood thinner medicine, always ask if NSAIDs are safe for you  Always read the medicine label and follow directions  Do not give these medicines to children under 10months of age without direction from your child's healthcare provider  ¨ Acetaminophen  decreases pain and fever  It is available without a doctor's order  Ask how much to take and how often to take it  Follow directions  Acetaminophen can cause liver damage if not taken correctly  ¨ Prescription pain medicine  may be given  Ask your healthcare provider how to take this medicine safely  ¨ Bisphosphonates and calcitonin  may be recommended to help your bones get stronger  They can decrease the pain of a VCF caused by osteoporosis, and decrease your risk for another fracture  · Physical and occupational therapy  may be recommended   A physical therapist teaches you exercises to help improve movement and strength, and to decrease pain  An occupational therapist teaches you skills to help with your daily activities  · Surgery  may be needed if your pain, weakness, or numbness does not go away after other treatments  Surgery may make your spine more stable, and help decrease pressure on your spinal nerves caused by the fracture  ¨ Vertebroplasty  is a procedure used to place bone cement into the fractured vertebrae  ¨ Kyphoplasty  is a procedure that uses a balloon to make a space in the fractured vertebrae  Bone cement is then put inside the space made by the balloon  ¨ Open surgery  returns bones to the right place and holds them together with wires, pins, plates, or screws  How can I manage pain while I sleep? · Do not sleep in a waterbed  Waterbeds do not provide good back support  · Sleep on a firm mattress  You may also put a ½ to 1-inch piece of plywood between the mattress and box spring  · Sleep on your back with a pillow under your knees  This will decrease pressure on your back  You may also sleep on your side with 1 or both of your knees bent and a pillow between them  It may also be helpful to sleep on your stomach with a pillow under you at waist level  When should I contact my healthcare provider? · You are not hungry, and you are losing weight  · You cannot sleep or rest because of back pain  · You have pain or swelling in your back that is getting worse, or does not go away  · You have questions or concerns about your condition or care  When should I seek immediate care or call 911? · You feel lightheaded, short of breath, and have chest pain  · You cough up blood  · Your arm or leg feels warm, tender, and painful  It may look swollen and red  · You have new problems urinating or having bowel movements  · You have severe pain in your back after falling, bending forward, sneezing, or coughing strongly  · You suddenly cannot feel your legs      · You suddenly have trouble moving your arms or legs  CARE AGREEMENT:   You have the right to help plan your care  Learn about your health condition and how it may be treated  Discuss treatment options with your caregivers to decide what care you want to receive  You always have the right to refuse treatment  The above information is an  only  It is not intended as medical advice for individual conditions or treatments  Talk to your doctor, nurse or pharmacist before following any medical regimen to see if it is safe and effective for you  © 2017 [address] Information is for End User's use only and may not be sold, redistributed or otherwise used for commercial purposes  All illustrations and images included in CareNotes® are the copyrighted property of [employer]  or Lavon Fisher

## 2018-07-07 NOTE — ED PROVIDER NOTES
History  Chief Complaint   Patient presents with    Back Pain     post fall 1 week ago     This is a 55-year-old female who was brought to the emergency department by ambulance crew complaining of right-sided chest wall pain and low back pain back pain the from a fall 1 week prior to arrival   At the time of the incident patient was brought to the emergency department of [facility] for evaluation and treatment  Patient was subsequently discharged from the same facility on the same day  When patient arrived home, although she was able to function, mobility was impaired according to the daughter who lives with the patient  Patient was brought back to [facility] for further evaluation and management  Patient denies loss of consciousness, dizziness, headache, nausea or vomiting or abdominal pain          History provided by:  Patient and EMS personnel (Daughter)   used: No    Fall   Mechanism of injury: fall    Injury location:  Head/neck and torso  Head/neck injury location:  Head  Torso injury location:  Back and R chest  Incident location:  Home  Time since incident:  7 days  Arrived directly from scene: yes    Fall:     Fall occurred:  Walking    Height of fall:  Unable to specify    Impact surface:  Unable to specify    Point of impact:  Head    Entrapped after fall: no    Protective equipment: none    Suspicion of alcohol use: no    Suspicion of drug use: no    Tetanus status:  Unknown  Prior to arrival data:     Bystander interventions:  None    Patient ambulatory at scene: no      Blood loss:  None    Responsiveness at scene:  Alert    Orientation at scene:  Person, place, situation and time    Loss of consciousness: no      Amnesic to event: no      Airway interventions:  None    Breathing interventions:  None    IV access status:  None    IO access:  None    Fluids administered:  None    Cardiac interventions:  None    Medications administered:  None    Immobilization:  None    Airway condition since incident:  Stable    Breathing condition since incident:  Stable    Circulation condition since incident:  Stable    Mental status condition since incident:  Stable    Disability condition since incident:  Stable  Associated symptoms: back pain and chest pain    Associated symptoms: no abdominal pain, no blindness, no difficulty breathing, no headaches, no hearing loss, no loss of consciousness, no nausea, no neck pain, no seizures and no vomiting    Risk factors comment:  Elderly      Prior to Admission Medications   Prescriptions Last Dose Informant Patient Reported? Taking? HYDROcodone-acetaminophen (NORCO) 7 5-325 mg per tablet   No No   Sig: Take 1 tablet by mouth daily Earliest Fill Date: 7/2/18 Max Daily Amount: 1 tablet   levothyroxine 100 mcg tablet   Yes No   Sig: Take 100 mcg by mouth daily      Facility-Administered Medications: None       Past Medical History:   Diagnosis Date    Hypothyroid        Past Surgical History:   Procedure Laterality Date    ANKLE SURGERY Left 11/06/2006    ORIF HIP FRACTURE Left 11/2006       Family History   Problem Relation Age of Onset    Cancer Neg Hx     Breast cancer Neg Hx      I have reviewed and agree with the history as documented  Social History   Substance Use Topics    Smoking status: Never Smoker    Smokeless tobacco: Never Used    Alcohol use No        Review of Systems   Constitutional: Positive for activity change  Negative for chills, fatigue and unexpected weight change  HENT: Negative for congestion, ear discharge, hearing loss, nosebleeds and voice change  Eyes: Negative for blindness, pain, discharge and redness  Respiratory: Negative for apnea, cough, chest tightness, shortness of breath and stridor  Cardiovascular: Positive for chest pain  Negative for palpitations and leg swelling  Gastrointestinal: Negative for abdominal pain, nausea and vomiting  Endocrine: Negative  Genitourinary: Negative for difficulty urinating, flank pain and hematuria  Musculoskeletal: Positive for back pain  Negative for neck pain  Skin: Negative for color change and rash  Allergic/Immunologic: Negative  Neurological: Negative for seizures, loss of consciousness and headaches  Hematological: Negative  Psychiatric/Behavioral: Negative  Physical Exam  Physical Exam   Constitutional: She is oriented to person, place, and time  She appears well-developed and well-nourished  No distress  HENT:   Head: Normocephalic  Right Ear: External ear normal    Left Ear: External ear normal    Nose: Nose normal    Mouth/Throat: Oropharynx is clear and moist  No oropharyngeal exudate  Eyes: Conjunctivae and EOM are normal  Pupils are equal, round, and reactive to light  Right eye exhibits no discharge  Left eye exhibits no discharge  No scleral icterus  Neck: Normal range of motion  Neck supple  No tracheal deviation present  No thyromegaly present  Cardiovascular: Normal rate, regular rhythm, normal heart sounds and intact distal pulses  Exam reveals no gallop and no friction rub  No murmur heard  Pulmonary/Chest: Effort normal and breath sounds normal  No stridor  No respiratory distress  She has no wheezes  She has no rales  Abdominal: Soft  Bowel sounds are normal  She exhibits no distension  There is no tenderness  Musculoskeletal: Normal range of motion  She exhibits no edema, tenderness or deformity  Lymphadenopathy:     She has no cervical adenopathy  Neurological: She is alert and oriented to person, place, and time  No cranial nerve deficit or sensory deficit  She exhibits normal muscle tone  Coordination normal  GCS eye subscore is 4  GCS verbal subscore is 5  GCS motor subscore is 6  Skin: Skin is warm and dry  No rash noted  She is not diaphoretic  No erythema  No pallor  Psychiatric: She has a normal mood and affect   Her behavior is normal  Judgment and thought content normal    Nursing note and vitals reviewed  Vital Signs  ED Triage Vitals   Temperature Pulse Respirations Blood Pressure SpO2   07/07/18 1132 07/07/18 1132 07/07/18 1132 07/07/18 1132 07/07/18 1132   97 9 °F (36 6 °C) 79 16 (!) 185/73 96 %      Temp Source Heart Rate Source Patient Position - Orthostatic VS BP Location FiO2 (%)   07/07/18 1132 07/07/18 1957 07/07/18 1132 07/07/18 1132 --   Temporal Monitor Lying Left arm       Pain Score       07/07/18 1132       Worst Possible Pain           Vitals:    07/07/18 1145 07/07/18 1245 07/07/18 1546 07/07/18 1957   BP: (!) 185/73 140/61 (!) 189/76 151/65   Pulse: 74 66 90 73   Patient Position - Orthostatic VS:   Lying        Visual Acuity  Visual Acuity      Most Recent Value   L Pupil Size (mm)  4   R Pupil Size (mm)  4          ED Medications  Medications   fentanyl citrate (PF) 100 MCG/2ML 50 mcg (50 mcg Intravenous Given 7/7/18 1342)       Diagnostic Studies  Results Reviewed     Procedure Component Value Units Date/Time    Comprehensive metabolic panel [[id number]]  (Abnormal) Collected:  07/07/18 1158    Lab Status:  Final result Specimen:  Blood from Arm, Left Updated:  07/07/18 1231     Sodium 136 mmol/L      Potassium 4 0 mmol/L      Chloride 102 mmol/L      CO2 29 mmol/L      Anion Gap 5 mmol/L      BUN 14 mg/dL      Creatinine 0 56 (L) mg/dL      Glucose 102 (H) mg/dL      Calcium 9 0 mg/dL      AST 16 U/L      ALT 7 U/L      Alkaline Phosphatase 55 U/L      Total Protein 6 6 g/dL      Albumin 3 6 g/dL      Total Bilirubin 1 00 mg/dL      eGFR 79 ml/min/1 73sq m     Narrative:         National Kidney Disease Education Program recommendations are as follows:  GFR calculation is accurate only with a steady state creatinine  Chronic Kidney disease less than 60 ml/min/1 73 sq  meters  Kidney failure less than 15 ml/min/1 73 sq  meters      APTT [[id number]]  (Normal) Collected:  07/07/18 1158    Lab Status:  Final result Specimen:  Blood from Arm, Left Updated:  07/07/18 1226     PTT 34 seconds     Protime-INR [[id number]]  (Normal) Collected:  07/07/18 1158    Lab Status:  Final result Specimen:  Blood from Arm, Left Updated:  07/07/18 1226     Protime 12 4 seconds      INR 1 07    CBC and differential [[id number]]  (Abnormal) Collected:  07/07/18 1158    Lab Status:  Final result Specimen:  Blood from Arm, Left Updated:  07/07/18 1211     WBC 7 70 Thousand/uL      RBC 3 62 (L) Million/uL      Hemoglobin 12 3 g/dL      Hematocrit 35 2 %      MCV 97 fL      MCH 33 8 pg      MCHC 34 9 g/dL      RDW 17 5 (H) %      MPV 9 0 fL      Platelets 439 Thousands/uL      nRBC 0 /100 WBCs      Neutrophils Relative 78 (H) %      Lymphocytes Relative 11 (L) %      Monocytes Relative 9 %      Eosinophils Relative 2 %      Basophils Relative 1 %      Neutrophils Absolute 6 00 Thousands/µL      Lymphocytes Absolute 0 80 Thousands/µL      Monocytes Absolute 0 70 Thousand/µL      Eosinophils Absolute 0 10 Thousand/µL      Basophils Absolute 0 10 Thousands/µL                  CT lumbar spine without contrast   Final Result by Shama Avila (07/07 1340)   No CT evidence of acute fracture or traumatic subluxation in the lumbar   spine  Old appearing compression deformities of L2 and L5  Multilevel   degenerative disc disease with areas of posterior disc displacement and   borderline to mild multilevel canal stenosis  Mild to moderate facet   arthritis and mild scoliosis  Follow-up nonemergent MRI may be beneficial   if the patient's symptoms persist          Signed by Shama Avila MD      CT chest without contrast   Final Result by Arnoldo Adkins (07/07 1324)   1  Age indeterminate compression deformity T8 without acute soft tissue   changes, findings likely represents a subacute compression fracture  2   Large hiatal hernia with intrathoracic stomach        3   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,   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  Signed by Martha Burch MD      CT head without contrast   Final Result by Arnaldo Klinefelter (07/07 1317)   No acute intracranial process  Signed by Arnaldo Klinefelter, MD                 Procedures  Suture Removal  Date/Time: 7/7/2018 4:58 PM  Performed by: Loida Norman  Authorized by: Loida Norman     Patient location:  ED  Other Assisting Provider: No    Consent:     Consent obtained:  Verbal    Consent given by:  Patient    Risks discussed:  Bleeding, pain and wound separation    Alternatives discussed:  No treatment  Universal protocol:     Procedure explained and questions answered to patient or proxy's satisfaction: yes      Relevant documents present and verified: yes      Test results available and properly labeled: yes      Radiology Images displayed and confirmed  If images not available, report reviewed: yes      Required blood products, implants, devices, and special equipment available: no      Site/side marked: yes      Immediately prior to procedure, a time out was called: yes      Patient identity confirmed:  Verbally with patient, arm band, provided demographic data and hospital-assigned identification number  Location:     Location:  [address] [place] [address] location:  Scalp  Procedure details: Tools used:  Suture removal kit    Wound appearance:  No sign(s) of infection    Number of sutures removed:  3  Post-procedure details:     Post-removal:  Dressing applied    Patient tolerance of procedure: Tolerated well, no immediate complications           Phone Contacts  ED Phone Contact    ED Course  ED Course as of Jul 07 2330   Sat Jul 07, 2018   1237 EKG was done on July 7, 2018 at 12:34 p m  EKG shows normal sinus rhythm with left axis deviation,heart rate is 64 beats per minute, moderate voltage criteria for LVH was noted, T-wave inversions were noted on leads V4 V5 V6    I have read and interpreted this EKG independently July 7, 2018 12:37 p m  Lea Sepulveda 1452 Test results were discussed with the patient and her daughter at the bedside  She was informed that she has subacute compression fracture of the 8th thoracic vertebra  She was offered to be kept in the hospital for observation but she prefers to go home  She states that she feels better and would like to go home rather than going for rehabilitation as inpatient  1725 Patient and family requested me to remove the sutures that were applied on the scalp 1 week prior to this visit to the emergency room  MDM  [facility]Care Time    Disposition  Final diagnoses:   Compression fracture of thoracic vertebra, closed, initial encounter ([facility] [address] )   Essential hypertension   Lumbar strain, initial encounter     Time reflects when diagnosis was documented in both MDM as applicable and the Disposition within this note     Time User Action Codes Description Comment    7/7/2018  2:55 PM Bala Duff [S22 000A] Compression fracture of thoracic vertebra, closed, initial encounter ([facility] [address] )     7/7/2018  2:55 PM Adalid Duff Essential hypertension     7/7/2018  2:56 PM Bala Duff [S39 012A] Lumbar strain, initial encounter       ED Disposition     ED Disposition Condition Comment    Discharge  Abbeyjanis Nazario discharge to home/self care      Condition at discharge: Good        Follow-up Information     Follow up With Specialties Details Why [address], MD Internal Medicine In 2 days for follow up [address]    [address]  [phone]            Discharge Medication List as of 7/7/2018  2:59 PM      START taking these medications    Details   gabapentin (NEURONTIN) 300 mg capsule Take 1 capsule (300 mg total) by mouth 2 (two) times a day, Starting Sat 7/7/2018, Print         CONTINUE these medications which have NOT CHANGED    Details   HYDROcodone-acetaminophen (NORCO) 7 5-325 mg per tablet Take 1 tablet by mouth daily Earliest Fill Date: 7/2/18 Max Daily Amount: 1 tablet, Starting Mon 7/2/2018, Print      levothyroxine 100 mcg tablet Take 100 mcg by mouth daily, Historical Med           No discharge procedures on file      ED Provider  Electronically Signed by           Matthew Driscoll MD  07/07/18 7226

## 2018-07-08 NOTE — ED RE-EVALUATION NOTE
THIS PATIENT WAS SIGNED OUT BY DR Katja Valdez, THE OUTGOING EDP  REFERENCE IS MADE TO THE MEDICAL RECORD COMPOSED BY DR Katja Valdez  ENTRIES TO THE RECORD AR MADE FOR THE PURPOSES OF CLARITY OF PURPOSE  THE PATIENT HAD BEEN PENDING A RIDE  SHE LEAVES THE ED IN THE CUSTODY OF   THERE HAVE BEEN NO INTERVENTIONS BY THIS EDP       April Gambino MD  07/07/18 6024

## 2018-07-09 LAB
ATRIAL RATE: 64 BPM
P AXIS: 45 DEGREES
PR INTERVAL: 150 MS
QRS AXIS: -39 DEGREES
QRSD INTERVAL: 108 MS
QT INTERVAL: 420 MS
QTC INTERVAL: 433 MS
T WAVE AXIS: 27 DEGREES
VENTRICULAR RATE: 64 BPM

## 2018-07-11 ENCOUNTER — OFFICE VISIT (OUTPATIENT)
Dept: FAMILY MEDICINE CLINIC | Facility: CLINIC | Age: 83
End: 2018-07-11
Payer: MEDICARE

## 2018-07-11 VITALS
BODY MASS INDEX: 30.23 KG/M2 | HEART RATE: 69 BPM | WEIGHT: 154 LBS | HEIGHT: 60 IN | OXYGEN SATURATION: 92 % | TEMPERATURE: 98.3 F | DIASTOLIC BLOOD PRESSURE: 58 MMHG | RESPIRATION RATE: 16 BRPM | SYSTOLIC BLOOD PRESSURE: 102 MMHG

## 2018-07-11 DIAGNOSIS — E78.00 HYPERCHOLESTEROLEMIA: ICD-10-CM

## 2018-07-11 DIAGNOSIS — S22.000A COMPRESSION FRACTURE OF THORACIC VERTEBRA, CLOSED, INITIAL ENCOUNTER (HCC): ICD-10-CM

## 2018-07-11 DIAGNOSIS — S39.012A LUMBAR STRAIN, INITIAL ENCOUNTER: ICD-10-CM

## 2018-07-11 DIAGNOSIS — E03.9 HYPOTHYROIDISM, UNSPECIFIED TYPE: Primary | ICD-10-CM

## 2018-07-11 PROCEDURE — 99214 OFFICE O/P EST MOD 30 MIN: CPT | Performed by: INTERNAL MEDICINE

## 2018-07-11 RX ORDER — LEVOTHYROXINE SODIUM 0.1 MG/1
100 TABLET ORAL DAILY
Qty: 90 TABLET | Refills: 3 | Status: SHIPPED | OUTPATIENT
Start: 2018-07-11

## 2018-07-11 RX ORDER — GABAPENTIN 300 MG/1
300 CAPSULE ORAL 2 TIMES DAILY
Qty: 60 CAPSULE | Refills: 0 | Status: SHIPPED | OUTPATIENT
Start: 2018-07-11 | End: 2018-10-10 | Stop reason: SDUPTHER

## 2018-07-11 NOTE — PROGRESS NOTES
Assessment/Plan:  Recent fall  Patient seen in the emergency room [facility]  CBC and chemistries CT scan of the head CAT scan of the chest showed  Old compression fracture of the T8 and old fractures of the L2-L4  Pain is well controlled with gabapentin and occasional use of hydrocodone  Hypercholesterolemia  Patient is not taking any medicine for this  Lumbar sprain  Well controlled with medications at present  I reviewed the lab data emergency room records CT scan and a all imaging reports with the patient and the daughter    Diagnoses and all orders for this visit:    Hypothyroidism, unspecified type  -     levothyroxine 100 mcg tablet; Take 1 tablet (100 mcg total) by mouth daily    Hypercholesterolemia    Compression fracture of thoracic vertebra, closed, initial encounter ([facility])  -     gabapentin (NEURONTIN) 300 mg capsule; Take 1 capsule (300 mg total) by mouth 2 (two) times a day    Lumbar strain, initial encounter  -     gabapentin (NEURONTIN) 300 mg capsule; Take 1 capsule (300 mg total) by mouth 2 (two) times a day        Subjective:      Patient ID: Vinny Anna is a 80 y o  female      80-year-old white female came in for his routine follow-up visit  On 07/07/2018 patient was taken to [facility] Emergency Room  She had a fall accidentally at home when she lost her balance a week before and felt complaining of pain in the ribs and him back of her right side of the head  No dizziness no fainting no chest pain no shortness of breathing was reported  Medical records from 07/07/2018 emergency room visit was reviewed and discussed with the patient  CBC CMP were unremarkable CT scan of the head was negative for any fracture or intracranial bleed  X-rays of the lumbar spine showed old fracture of the lumbar spine area and  Thoracic CT showed large hiatal hernia and a compression fracture of the T8 which was also old  Patient was discharged home she lives with her daughter who came along with her during this visit in my office also  Patient did receive tetanus shot in emergency and the sutures from her right side of the head have been removed and a wound is completely healed          The following portions of the patient's history were reviewed and updated as appropriate: allergies, current medications, past family history, past medical history, past social history, past surgical history and problem list     Review of Systems   Constitutional: Negative  HENT: Negative  Eyes: Negative  Respiratory: Negative  Cardiovascular: Negative  Gastrointestinal: Negative  Endocrine: Negative  Genitourinary: Negative  Musculoskeletal: Negative  Skin: Negative  Allergic/Immunologic: Negative  Neurological: Negative  Hematological: Negative  Psychiatric/Behavioral: Negative  Objective:      /58   Pulse 69   Temp 98 3 °F (36 8 °C) (Tympanic)   Resp 16   Ht 5' (1 524 m)   Wt 69 9 kg (154 lb)   SpO2 92%   BMI 30 08 kg/m²          Physical Exam   Constitutional: She is oriented to person, place, and time  She appears well-developed and well-nourished  Patient was brought in the hospital bed in my office  Daughter was along with her also   HENT:   Head: Normocephalic  Mouth/Throat: Oropharynx is clear and moist    Eyes: Conjunctivae are normal  Pupils are equal, round, and reactive to light  Neck: Normal range of motion  Neck supple  Cardiovascular: Normal rate and normal heart sounds  Pulmonary/Chest: Effort normal and breath sounds normal    Abdominal: Soft  Bowel sounds are normal    Musculoskeletal: Normal range of motion  Neurological: She is alert and oriented to person, place, and time  Skin: Skin is warm and dry             Lab Results   Component Value Date    GLUCOSE 102 (H) 07/07/2018    CALCIUM 9 0 07/07/2018     07/07/2018    K 4 0 07/07/2018    CO2 29 07/07/2018     07/07/2018    BUN 14 07/07/2018    CREATININE 0 56 (L) 07/07/2018       Lab Results   Component Value Date     07/07/2018    K 4 0 07/07/2018     07/07/2018    CO2 29 07/07/2018    ANIONGAP 5 07/07/2018    BUN 14 07/07/2018    CREATININE 0 56 (L) 07/07/2018    GLUCOSE 102 (H) 07/07/2018    CALCIUM 9 0 07/07/2018    AST 16 07/07/2018    ALT 7 07/07/2018    ALKPHOS 55 07/07/2018    PROT 6 6 07/07/2018    BILITOT 1 00 07/07/2018    EGFR 79 07/07/2018       Lab Results   Component Value Date    WBC 7 70 07/07/2018    HGB 12 3 07/07/2018    HCT 35 2 07/07/2018    MCV 97 07/07/2018     07/07/2018       Lab Results   Component Value Date    CHOL 168 04/11/2018     Lab Results   Component Value Date    HDL 36 (L) 04/11/2018     Lab Results   Component Value Date    LDLCALC 113 3 04/11/2018     Lab Results   Component Value Date    TRIG 94 04/11/2018     No components found for: [place], [place]    Lab Results   Component Value Date    I2HCARD 78 63 (L) 04/11/2018    M0IQHAR 10 34 04/11/2018

## 2018-10-10 ENCOUNTER — OFFICE VISIT (OUTPATIENT)
Dept: FAMILY MEDICINE CLINIC | Facility: CLINIC | Age: 83
End: 2018-10-10
Payer: MEDICARE

## 2018-10-10 VITALS
RESPIRATION RATE: 16 BRPM | TEMPERATURE: 98.5 F | SYSTOLIC BLOOD PRESSURE: 128 MMHG | HEART RATE: 70 BPM | OXYGEN SATURATION: 96 % | DIASTOLIC BLOOD PRESSURE: 64 MMHG

## 2018-10-10 DIAGNOSIS — M54.50 CHRONIC LOW BACK PAIN WITHOUT SCIATICA, UNSPECIFIED BACK PAIN LATERALITY: ICD-10-CM

## 2018-10-10 DIAGNOSIS — S39.012A LUMBAR STRAIN, INITIAL ENCOUNTER: ICD-10-CM

## 2018-10-10 DIAGNOSIS — Z23 INFLUENZA VACCINE ADMINISTERED: Primary | ICD-10-CM

## 2018-10-10 DIAGNOSIS — Z87.81 S/P LEFT HIP FRACTURE: ICD-10-CM

## 2018-10-10 DIAGNOSIS — S22.000A COMPRESSION FRACTURE OF THORACIC VERTEBRA, CLOSED, INITIAL ENCOUNTER (HCC): ICD-10-CM

## 2018-10-10 DIAGNOSIS — E78.00 HYPERCHOLESTEROLEMIA: ICD-10-CM

## 2018-10-10 DIAGNOSIS — G89.29 CHRONIC LOW BACK PAIN WITHOUT SCIATICA, UNSPECIFIED BACK PAIN LATERALITY: ICD-10-CM

## 2018-10-10 PROBLEM — S82.892A FRACTURE DISLOCATION OF LEFT ANKLE: Status: ACTIVE | Noted: 2018-10-10

## 2018-10-10 PROCEDURE — 99214 OFFICE O/P EST MOD 30 MIN: CPT | Performed by: INTERNAL MEDICINE

## 2018-10-10 PROCEDURE — G0008 ADMIN INFLUENZA VIRUS VAC: HCPCS

## 2018-10-10 PROCEDURE — 90662 IIV NO PRSV INCREASED AG IM: CPT

## 2018-10-10 RX ORDER — GABAPENTIN 300 MG/1
300 CAPSULE ORAL 2 TIMES DAILY
Qty: 60 CAPSULE | Refills: 3 | Status: SHIPPED | OUTPATIENT
Start: 2018-10-10

## 2018-10-10 RX ORDER — HYDROCODONE BITARTRATE AND ACETAMINOPHEN 7.5; 325 MG/1; MG/1
1 TABLET ORAL DAILY
Qty: 30 TABLET | Refills: 0 | Status: SHIPPED | OUTPATIENT
Start: 2018-10-10

## 2018-10-10 NOTE — PROGRESS NOTES
Assessment/Plan:  Hypothyroidism  Patient is on replacement therapy with levothyroxine 100 mcg daily  Hypercholesterolemia is not on any medication and is not necessary at her age also  Murmur in the aortic area grade 1-8/8 systolic murmur  Totally asymptomatic and does not require any further evaluation given her advanced age patient understands this and the family also in concurrence with this  See no reason to do echocardiogram since treatment options of will be limited  Chronic low back pain due to arthritis takes Vicodin ES 1 tablet b i d  p r n  The lab data in July 2018 was reviewed and discussed with the patient and family  Mammogram is not necessary at this age  Colorectal cancer screening not indicated for advanced age family and patient are aware of that  Diagnoses and all orders for this visit:    Influenza vaccine administered  -     influenza vaccine, 2176-8751, high-dose, PF 0 5 mL, for patients 65 yr+ (FLUZONE HIGH-DOSE)    Chronic low back pain without sciatica, unspecified back pain laterality  -     HYDROcodone-acetaminophen (NORCO) 7 5-325 mg per tablet; Take 1 tablet by mouth daily Earliest Fill Date: 10/10/18 Max Daily Amount: 1 tablet    Hypercholesterolemia    S/p left hip fracture    Compression fracture of thoracic vertebra, closed, initial encounter ([facility])  -     gabapentin (NEURONTIN) 300 mg capsule; Take 1 capsule (300 mg total) by mouth 2 (two) times a day    Lumbar strain, initial encounter  -     gabapentin (NEURONTIN) 300 mg capsule; Take 1 capsule (300 mg total) by mouth 2 (two) times a day        Subjective:      Patient ID: Radha Arnold is a 80 y o  female      59-year-old white female is coming in for a routine follow-up visit offers no new complaints  Patient has a history of hypothyroidism for which she is on replacement therapy she had fracture of the left hip and ankle status post open reduction internal fixation both of them in the past chronic low back pain due to arthritis patient limited to a wheelchair now and the daughter provides ongoing care who lives with her        The following portions of the patient's history were reviewed and updated as appropriate: allergies, current medications, past family history, past medical history, past social history, past surgical history and problem list       Current Outpatient Prescriptions:     gabapentin (NEURONTIN) 300 mg capsule, Take 1 capsule (300 mg total) by mouth 2 (two) times a day, Disp: 60 capsule, Rfl: 3    HYDROcodone-acetaminophen (NORCO) 7 5-325 mg per tablet, Take 1 tablet by mouth daily Earliest Fill Date: 10/10/18 Max Daily Amount: 1 tablet, Disp: 30 tablet, Rfl: 0    levothyroxine 100 mcg tablet, Take 1 tablet (100 mcg total) by mouth daily, Disp: 90 tablet, Rfl: 3    Past Medical History:   Diagnosis Date    Hypothyroid        Past Surgical History:   Procedure Laterality Date    ANKLE SURGERY Left 11/06/2006    ORIF HIP FRACTURE Left 11/2006       Social History       Patient Active Problem List   Diagnosis    Hypercholesterolemia    Hypothyroid    S/p left hip fracture    Fracture dislocation of left ankle           Review of Systems   Constitutional: Negative  HENT: Negative  Eyes: Negative  Respiratory: Negative  Cardiovascular: Negative  Gastrointestinal: Negative  Endocrine: Negative  Genitourinary: Negative  Musculoskeletal: Negative  Skin: Negative  Allergic/Immunologic: Negative  Neurological: Negative  Hematological: Negative  Psychiatric/Behavioral: Negative  Objective:      /64   Pulse 70   Temp 98 5 °F (36 9 °C) (Tympanic)   Resp 16   SpO2 96%          Physical Exam   Constitutional: She is oriented to person, place, and time  She appears well-developed and well-nourished     Patient is in wheelchair due to arthritis and chronic low back pain prior history of open reduction internal fixation left hip and left ankle   HENT:   Head: Normocephalic  Mouth/Throat: Oropharynx is clear and moist    Eyes: Pupils are equal, round, and reactive to light  Conjunctivae are normal    Neck: Normal range of motion  Neck supple  Cardiovascular: Normal rate and normal heart sounds  Pulmonary/Chest: Effort normal and breath sounds normal    Abdominal: Soft  Bowel sounds are normal    Musculoskeletal: Normal range of motion  Neurological: She is alert and oriented to person, place, and time  Skin: Skin is warm and dry             Admission on 07/07/2018, Discharged on 07/07/2018   Component Date Value Ref Range Status    WBC 07/07/2018 7 70  4 80 - 10 80 Thousand/uL Final    RBC 07/07/2018 3 62* 3 90 - 5 20 Million/uL Final    Hemoglobin 07/07/2018 12 3  12 0 - 16 0 g/dL Final    Hematocrit 07/07/2018 35 2  34 8 - 46 1 % Final    MCV 07/07/2018 97  81 - 99 fL Final    MCH 07/07/2018 33 8  26 0 - 34 0 pg Final    MCHC 07/07/2018 34 9  31 0 - 37 0 g/dL Final    RDW 07/07/2018 17 5* 11 5 - 14 5 % Final    MPV 07/07/2018 9 0  8 6 - 11 7 fL Final    Platelets 06/52/9763 254  149 - 390 Thousands/uL Final    nRBC 07/07/2018 0  /100 WBCs Final    Neutrophils Relative 07/07/2018 78* 42 - 75 % Final    Lymphocytes Relative 07/07/2018 11* 21 - 51 % Final    Monocytes Relative 07/07/2018 9  2 - 12 % Final    Eosinophils Relative 07/07/2018 2  0 - 5 % Final    Basophils Relative 07/07/2018 1  0 - 2 % Final    Neutrophils Absolute 07/07/2018 6 00  1 40 - 6 50 Thousands/µL Final    Lymphocytes Absolute 07/07/2018 0 80  0 60 - 4 47 Thousands/µL Final    Monocytes Absolute 07/07/2018 0 70  0 17 - 1 22 Thousand/µL Final    Eosinophils Absolute 07/07/2018 0 10  0 00 - 0 61 Thousand/µL Final    Basophils Absolute 07/07/2018 0 10  0 00 - 0 10 Thousands/µL Final    Protime 07/07/2018 12 4  10 1 - 12 9 seconds Final         INR 07/07/2018 1 07  0 90 - 1 50 Final         PTT 07/07/2018 34  24 - 36 seconds Final    Therapeutic Heparin Range =  60-90 seconds    Sodium 07/07/2018 136  134 - 143 mmol/L Final    Potassium 07/07/2018 4 0  3 5 - 5 5 mmol/L Final    Chloride 07/07/2018 102  98 - 107 mmol/L Final    CO2 07/07/2018 29  21 - 31 mmol/L Final    ANION GAP 07/07/2018 5  4 - 13 mmol/L Final    BUN 07/07/2018 14  7 - 25 mg/dL Final    Creatinine 07/07/2018 0 56* 0 60 - 1 20 mg/dL Final    Standardized to IDMS reference method    Glucose 07/07/2018 102* 65 - 99 mg/dL Final      If the patient is fasting, the ADA then defines impaired fasting glucose as > 100 mg/dL and diabetes as > or equal to 123 mg/dL  Specimen collection should occur prior to Sulfasalazine administration due to the potential for falsely depressed results  Specimen collection should occur prior to Sulfapyridine administration due to the potential for falsely elevated results   Calcium 07/07/2018 9 0  8 6 - 10 5 mg/dL Final    AST 07/07/2018 16  13 - 39 U/L Final      Specimen collection should occur prior to Sulfasalazine administration due to the potential for falsely depressed results   ALT 07/07/2018 7  7 - 52 U/L Final      Specimen collection should occur prior to Sulfasalazine administration due to the potential for falsely depressed results   Alkaline Phosphatase 07/07/2018 55  55 - 165 U/L Final    Total Protein 07/07/2018 6 6  6 4 - 8 9 g/dL Final    Albumin 07/07/2018 3 6  3 5 - 5 7 g/dL Final    Total Bilirubin 07/07/2018 1 00  0 20 - 1 00 mg/dL Final    eGFR 07/07/2018 79  ml/min/1 73sq m Final    Ventricular Rate 07/07/2018 64  BPM Final    Atrial Rate 07/07/2018 64  BPM Final    NE Interval 07/07/2018 150  ms Final    QRSD Interval 07/07/2018 108  ms Final    QT Interval 07/07/2018 420  ms Final    QTC Interval 07/07/2018 433  ms Final    P Axis 07/07/2018 45  degrees Final    QRS Axis 07/07/2018 -39  degrees Final    T Wave [place] 07/07/2018 27  degrees Final       No results found

## 2018-11-18 ENCOUNTER — APPOINTMENT (EMERGENCY)
Dept: RADIOLOGY | Facility: HOSPITAL | Age: 83
DRG: 872 | End: 2018-11-18
Payer: MEDICARE

## 2018-11-18 ENCOUNTER — HOSPITAL ENCOUNTER (INPATIENT)
Facility: HOSPITAL | Age: 83
LOS: 2 days | Discharge: HOME | DRG: 872 | End: 2018-11-20
Attending: EMERGENCY MEDICINE | Admitting: INTERNAL MEDICINE
Payer: MEDICARE

## 2018-11-18 DIAGNOSIS — N39.0 UTI (URINARY TRACT INFECTION): Primary | ICD-10-CM

## 2018-11-18 DIAGNOSIS — M25.50 JOINT PAIN: ICD-10-CM

## 2018-11-18 PROBLEM — E03.9 HYPOTHYROID: Chronic | Status: ACTIVE | Noted: 2018-06-27

## 2018-11-18 PROBLEM — L65.9 ALOPECIA: Chronic | Status: ACTIVE | Noted: 2018-11-18

## 2018-11-18 PROBLEM — R53.1 WEAKNESS GENERALIZED: Status: ACTIVE | Noted: 2018-11-18

## 2018-11-18 PROBLEM — R26.2 AMBULATORY DYSFUNCTION: Chronic | Status: ACTIVE | Noted: 2018-11-18

## 2018-11-18 PROBLEM — E78.00 HYPERCHOLESTEROLEMIA: Chronic | Status: ACTIVE | Noted: 2018-06-27

## 2018-11-18 PROBLEM — A41.9 SEPSIS (HCC): Status: ACTIVE | Noted: 2018-11-18

## 2018-11-18 PROBLEM — N30.01 ACUTE CYSTITIS WITH HEMATURIA: Status: ACTIVE | Noted: 2018-11-18

## 2018-11-18 PROBLEM — A41.9 SEPSIS (HCC): Chronic | Status: ACTIVE | Noted: 2018-11-18

## 2018-11-18 PROBLEM — N30.01 ACUTE CYSTITIS WITH HEMATURIA: Chronic | Status: ACTIVE | Noted: 2018-11-18

## 2018-11-18 PROBLEM — R53.1 WEAKNESS GENERALIZED: Chronic | Status: ACTIVE | Noted: 2018-11-18

## 2018-11-18 LAB
ALBUMIN SERPL BCP-MCNC: 3.2 G/DL (ref 3.5–5.7)
ALP SERPL-CCNC: 53 U/L (ref 55–165)
ALT SERPL W P-5'-P-CCNC: 10 U/L (ref 7–52)
ANION GAP SERPL CALCULATED.3IONS-SCNC: 7 MMOL/L (ref 4–13)
AST SERPL W P-5'-P-CCNC: 17 U/L (ref 13–39)
ATRIAL RATE: 88 BPM
BACTERIA UR QL AUTO: ABNORMAL /HPF
BASOPHILS # BLD AUTO: 0.1 THOUSANDS/ΜL (ref 0–0.1)
BASOPHILS NFR BLD AUTO: 1 % (ref 0–2)
BILIRUB SERPL-MCNC: 0.9 MG/DL (ref 0.2–1)
BILIRUB UR QL STRIP: NEGATIVE
BUN SERPL-MCNC: 13 MG/DL (ref 7–25)
CALCIUM SERPL-MCNC: 8.8 MG/DL (ref 8.6–10.5)
CHLORIDE SERPL-SCNC: 101 MMOL/L (ref 98–107)
CK SERPL-CCNC: 22 U/L (ref 30–192)
CLARITY UR: ABNORMAL
CO2 SERPL-SCNC: 30 MMOL/L (ref 21–31)
COLOR UR: YELLOW
CREAT SERPL-MCNC: 0.61 MG/DL (ref 0.6–1.2)
EOSINOPHIL # BLD AUTO: 0 THOUSAND/ΜL (ref 0–0.61)
EOSINOPHIL NFR BLD AUTO: 1 % (ref 0–5)
ERYTHROCYTE [DISTWIDTH] IN BLOOD BY AUTOMATED COUNT: 17.8 % (ref 11.5–14.5)
ERYTHROCYTE [SEDIMENTATION RATE] IN BLOOD: 92 MM/HOUR (ref 0–30)
GFR SERPL CREATININE-BSD FRML MDRD: 77 ML/MIN/1.73SQ M
GLUCOSE SERPL-MCNC: 123 MG/DL (ref 65–99)
GLUCOSE UR STRIP-MCNC: NEGATIVE MG/DL
HCT VFR BLD AUTO: 34.8 % (ref 34.8–46.1)
HGB BLD-MCNC: 11.6 G/DL (ref 12–16)
HGB UR QL STRIP.AUTO: ABNORMAL
KETONES UR STRIP-MCNC: NEGATIVE MG/DL
LEUKOCYTE ESTERASE UR QL STRIP: ABNORMAL
LIPASE SERPL-CCNC: 16 U/L (ref 11–82)
LYMPHOCYTES # BLD AUTO: 0.5 THOUSANDS/ΜL (ref 0.6–4.47)
LYMPHOCYTES NFR BLD AUTO: 6 % (ref 21–51)
MAGNESIUM SERPL-MCNC: 2.1 MG/DL (ref 1.9–2.7)
MCH RBC QN AUTO: 32 PG (ref 26–34)
MCHC RBC AUTO-ENTMCNC: 33.2 G/DL (ref 31–37)
MCV RBC AUTO: 97 FL (ref 81–99)
MONOCYTES # BLD AUTO: 0.7 THOUSAND/ΜL (ref 0.17–1.22)
MONOCYTES NFR BLD AUTO: 8 % (ref 2–12)
NEUTROPHILS # BLD AUTO: 7.7 THOUSANDS/ΜL (ref 1.4–6.5)
NEUTS SEG NFR BLD AUTO: 85 % (ref 42–75)
NITRITE UR QL STRIP: POSITIVE
NON-SQ EPI CELLS URNS QL MICRO: ABNORMAL /HPF
NRBC BLD AUTO-RTO: 0 /100 WBCS
P AXIS: 42 DEGREES
PH UR STRIP.AUTO: 6 [PH] (ref 5–8)
PLATELET # BLD AUTO: 321 THOUSANDS/UL (ref 149–390)
PLATELET # BLD AUTO: 331 THOUSANDS/UL (ref 149–390)
PMV BLD AUTO: 9.1 FL (ref 8.6–11.7)
POTASSIUM SERPL-SCNC: 4.2 MMOL/L (ref 3.5–5.5)
PR INTERVAL: 136 MS
PROT SERPL-MCNC: 6.9 G/DL (ref 6.4–8.9)
PROT UR STRIP-MCNC: ABNORMAL MG/DL
QRS AXIS: -36 DEGREES
QRSD INTERVAL: 96 MS
QT INTERVAL: 372 MS
QTC INTERVAL: 450 MS
RBC # BLD AUTO: 3.61 MILLION/UL (ref 3.9–5.2)
RBC #/AREA URNS AUTO: ABNORMAL /HPF
SODIUM SERPL-SCNC: 138 MMOL/L (ref 134–143)
SP GR UR STRIP.AUTO: 1.02 (ref 1–1.03)
T WAVE AXIS: 74 DEGREES
TROPONIN I SERPL-MCNC: <0.03 NG/ML
TSH SERPL DL<=0.05 MIU/L-ACNC: 1.59 UIU/ML (ref 0.45–5.33)
TSH SERPL DL<=0.05 MIU/L-ACNC: 2.29 UIU/ML (ref 0.45–5.33)
UROBILINOGEN UR QL STRIP.AUTO: 1 E.U./DL
VENTRICULAR RATE: 88 BPM
WBC # BLD AUTO: 9.1 THOUSAND/UL (ref 4.8–10.8)
WBC #/AREA URNS AUTO: ABNORMAL /HPF

## 2018-11-18 PROCEDURE — 93005 ELECTROCARDIOGRAM TRACING: CPT

## 2018-11-18 PROCEDURE — 71045 X-RAY EXAM CHEST 1 VIEW: CPT

## 2018-11-18 PROCEDURE — 87077 CULTURE AEROBIC IDENTIFY: CPT | Performed by: EMERGENCY MEDICINE

## 2018-11-18 PROCEDURE — 93010 ELECTROCARDIOGRAM REPORT: CPT | Performed by: INTERNAL MEDICINE

## 2018-11-18 PROCEDURE — 84443 ASSAY THYROID STIM HORMONE: CPT | Performed by: EMERGENCY MEDICINE

## 2018-11-18 PROCEDURE — 99223 1ST HOSP IP/OBS HIGH 75: CPT | Performed by: NURSE PRACTITIONER

## 2018-11-18 PROCEDURE — 96361 HYDRATE IV INFUSION ADD-ON: CPT

## 2018-11-18 PROCEDURE — 84484 ASSAY OF TROPONIN QUANT: CPT | Performed by: EMERGENCY MEDICINE

## 2018-11-18 PROCEDURE — 96365 THER/PROPH/DIAG IV INF INIT: CPT

## 2018-11-18 PROCEDURE — 83690 ASSAY OF LIPASE: CPT | Performed by: EMERGENCY MEDICINE

## 2018-11-18 PROCEDURE — 82550 ASSAY OF CK (CPK): CPT | Performed by: EMERGENCY MEDICINE

## 2018-11-18 PROCEDURE — 99285 EMERGENCY DEPT VISIT HI MDM: CPT

## 2018-11-18 PROCEDURE — 80053 COMPREHEN METABOLIC PANEL: CPT | Performed by: EMERGENCY MEDICINE

## 2018-11-18 PROCEDURE — 87186 SC STD MICRODIL/AGAR DIL: CPT | Performed by: EMERGENCY MEDICINE

## 2018-11-18 PROCEDURE — 96375 TX/PRO/DX INJ NEW DRUG ADDON: CPT

## 2018-11-18 PROCEDURE — 83735 ASSAY OF MAGNESIUM: CPT | Performed by: EMERGENCY MEDICINE

## 2018-11-18 PROCEDURE — 81001 URINALYSIS AUTO W/SCOPE: CPT | Performed by: EMERGENCY MEDICINE

## 2018-11-18 PROCEDURE — 85049 AUTOMATED PLATELET COUNT: CPT | Performed by: NURSE PRACTITIONER

## 2018-11-18 PROCEDURE — 84443 ASSAY THYROID STIM HORMONE: CPT | Performed by: NURSE PRACTITIONER

## 2018-11-18 PROCEDURE — 85652 RBC SED RATE AUTOMATED: CPT | Performed by: EMERGENCY MEDICINE

## 2018-11-18 PROCEDURE — 87086 URINE CULTURE/COLONY COUNT: CPT | Performed by: EMERGENCY MEDICINE

## 2018-11-18 PROCEDURE — 36415 COLL VENOUS BLD VENIPUNCTURE: CPT | Performed by: EMERGENCY MEDICINE

## 2018-11-18 PROCEDURE — 84484 ASSAY OF TROPONIN QUANT: CPT | Performed by: NURSE PRACTITIONER

## 2018-11-18 PROCEDURE — 85025 COMPLETE CBC W/AUTO DIFF WBC: CPT | Performed by: EMERGENCY MEDICINE

## 2018-11-18 RX ORDER — ONDANSETRON 2 MG/ML
4 INJECTION INTRAMUSCULAR; INTRAVENOUS EVERY 6 HOURS PRN
Status: DISCONTINUED | OUTPATIENT
Start: 2018-11-18 | End: 2018-11-20 | Stop reason: HOSPADM

## 2018-11-18 RX ORDER — ACETAMINOPHEN 325 MG/1
650 TABLET ORAL EVERY 6 HOURS PRN
Status: DISCONTINUED | OUTPATIENT
Start: 2018-11-18 | End: 2018-11-20 | Stop reason: HOSPADM

## 2018-11-18 RX ORDER — ACETAMINOPHEN 325 MG/1
650 TABLET ORAL ONCE
Status: COMPLETED | OUTPATIENT
Start: 2018-11-18 | End: 2018-11-18

## 2018-11-18 RX ORDER — GABAPENTIN 300 MG/1
300 CAPSULE ORAL 3 TIMES DAILY
Status: DISCONTINUED | OUTPATIENT
Start: 2018-11-18 | End: 2018-11-18

## 2018-11-18 RX ORDER — METHYLPREDNISOLONE SODIUM SUCCINATE 125 MG/2ML
80 INJECTION, POWDER, LYOPHILIZED, FOR SOLUTION INTRAMUSCULAR; INTRAVENOUS ONCE
Status: COMPLETED | OUTPATIENT
Start: 2018-11-18 | End: 2018-11-18

## 2018-11-18 RX ORDER — GABAPENTIN 300 MG/1
300 CAPSULE ORAL 2 TIMES DAILY
Status: DISCONTINUED | OUTPATIENT
Start: 2018-11-18 | End: 2018-11-20 | Stop reason: HOSPADM

## 2018-11-18 RX ORDER — SODIUM CHLORIDE 9 MG/ML
125 INJECTION, SOLUTION INTRAVENOUS ONCE
Status: COMPLETED | OUTPATIENT
Start: 2018-11-18 | End: 2018-11-18

## 2018-11-18 RX ORDER — LEVOTHYROXINE SODIUM 0.1 MG/1
100 TABLET ORAL DAILY
Status: DISCONTINUED | OUTPATIENT
Start: 2018-11-18 | End: 2018-11-20 | Stop reason: HOSPADM

## 2018-11-18 RX ORDER — HEPARIN SODIUM 5000 [USP'U]/ML
5000 INJECTION, SOLUTION INTRAVENOUS; SUBCUTANEOUS EVERY 8 HOURS SCHEDULED
Status: DISCONTINUED | OUTPATIENT
Start: 2018-11-18 | End: 2018-11-20 | Stop reason: HOSPADM

## 2018-11-18 RX ADMIN — GABAPENTIN 300 MG: 300 CAPSULE ORAL at 17:17

## 2018-11-18 RX ADMIN — METHYLPREDNISOLONE SODIUM SUCCINATE 80 MG: 125 INJECTION, POWDER, FOR SOLUTION INTRAMUSCULAR; INTRAVENOUS at 10:49

## 2018-11-18 RX ADMIN — SODIUM CHLORIDE 125 ML/HR: 9 INJECTION, SOLUTION INTRAVENOUS at 14:55

## 2018-11-18 RX ADMIN — LEVOTHYROXINE SODIUM 100 MCG: 100 TABLET ORAL at 14:58

## 2018-11-18 RX ADMIN — ACETAMINOPHEN 650 MG: 325 TABLET ORAL at 10:49

## 2018-11-18 RX ADMIN — CEFTRIAXONE 1000 MG: 1 INJECTION, SOLUTION INTRAVENOUS at 11:09

## 2018-11-18 RX ADMIN — HEPARIN SODIUM 5000 UNITS: 5000 INJECTION INTRAVENOUS; SUBCUTANEOUS at 22:04

## 2018-11-18 RX ADMIN — SODIUM CHLORIDE 500 ML: 0.9 INJECTION, SOLUTION INTRAVENOUS at 11:09

## 2018-11-18 RX ADMIN — HEPARIN SODIUM 5000 UNITS: 5000 INJECTION INTRAVENOUS; SUBCUTANEOUS at 15:00

## 2018-11-18 NOTE — SOCIAL WORK
Evaluated the pt with the daughter present  Pt lives with her daughter in a one story home  Pt has 4 steps outside the home to get in  Pt pivots onto the transport chair and takes one step with a walker  Daughter states she does as much of her ADL's as she can and daughter provides assistance with her depends and lower clothing  Daughter provides all IADL assistance  Daughter states pt got a flu shot in Oct and started having pains throughout her body  However pain has worsened  Pt gets her medications from [employer]  Pt has a walker, BSC, transport chair at home  Will await a PT evaluation to determine a discharge plan  Daughter and pt are agreeable to same  CM reviewed d/c planning process including the following: identifying help at home, patient preference for d/c planning needs, availability of treatment team to discuss questions or concerns patient and/or family may have regarding understanding medications and recognizing signs and symptoms once discharged  CM also encouraged patient to follow up with all recommended appointments after discharge  Patient advised of importance for patient and family to participate in managing patients medical well being

## 2018-11-18 NOTE — ED PROVIDER NOTES
History  Chief Complaint   Patient presents with    Weakness - Generalized     generalized weakness for 2 weeks, accompanied by diffuse pain  pt seen here 2 weeks ago, sent home  pt also reports a fall in june with hip fx and back injury     From home w/generalized weakness and aches and pains for past 2 weeks, no fever but had episode of chills yesterday  Per patient and daughter (who lives w/patient), no: chest pain, SOB, cough, abdominal pain, or acute bowel/bladder changes  Prior to Admission Medications   Prescriptions Last Dose Informant Patient Reported? Taking? HYDROcodone-acetaminophen (NORCO) 7 5-325 mg per tablet Unknown at Unknown time  No No   Sig: Take 1 tablet by mouth daily Earliest Fill Date: 10/10/18 Max Daily Amount: 1 tablet   gabapentin (NEURONTIN) 300 mg capsule 11/18/2018 at Unknown time  No Yes   Sig: Take 1 capsule (300 mg total) by mouth 2 (two) times a day   levothyroxine 100 mcg tablet 11/18/2018 at Unknown time  No Yes   Sig: Take 1 tablet (100 mcg total) by mouth daily      Facility-Administered Medications: None       Past Medical History:   Diagnosis Date    Hypothyroid        Past Surgical History:   Procedure Laterality Date    ANKLE SURGERY Left 11/06/2006    ORIF HIP FRACTURE Left 11/2006       Family History   Problem Relation Age of Onset    Cancer Neg Hx     Breast cancer Neg Hx      I have reviewed and agree with the history as documented  Social History   Substance Use Topics    Smoking status: Never Smoker    Smokeless tobacco: Never Used    Alcohol use No        Review of Systems   Constitutional: Positive for chills  Negative for fever  HENT: Negative for rhinorrhea and sore throat  Eyes: Negative for visual disturbance  Respiratory: Negative for cough and shortness of breath  Cardiovascular: Negative for chest pain and leg swelling  Gastrointestinal: Negative for abdominal pain, diarrhea, nausea and vomiting     Genitourinary: Negative for dysuria  Musculoskeletal: Negative for back pain and myalgias  Skin: Negative for rash  Neurological: Negative for dizziness and headaches  Psychiatric/Behavioral: Negative for confusion  All other systems reviewed and are negative  Physical Exam  Physical Exam   Constitutional: She is oriented to person, place, and time  She appears well-developed and well-nourished  HENT:   Nose: Nose normal    Mouth/Throat: Oropharynx is clear and moist  No oropharyngeal exudate  Eyes: Pupils are equal, round, and reactive to light  EOM are normal  Right eye exhibits no discharge  Left eye exhibits no discharge  No scleral icterus  Neck: Normal range of motion  Neck supple  No JVD present  No tracheal deviation present  Cardiovascular: Normal rate and regular rhythm  Murmur heard  Systolic murmur is present with a grade of 1/6   Pulmonary/Chest: Effort normal and breath sounds normal  No respiratory distress  She has no wheezes  She has no rales  Abdominal: Soft  Bowel sounds are normal  There is no tenderness  There is no guarding  Musculoskeletal: Normal range of motion  She exhibits no edema or tenderness  Neurological: She is alert and oriented to person, place, and time  No cranial nerve deficit or sensory deficit  She exhibits normal muscle tone  5/5 motor, nl sens   Skin: Skin is warm and dry  Psychiatric: She has a normal mood and affect  Her behavior is normal    Nursing note and vitals reviewed        Vital Signs  ED Triage Vitals [11/18/18 0947]   Temperature Pulse Respirations Blood Pressure SpO2   98 9 °F (37 2 °C) 91 (!) 41 138/63 95 %      Temp Source Heart Rate Source Patient Position - Orthostatic VS BP Location FiO2 (%)   Temporal Monitor Lying Left arm --      Pain Score       Worst Possible Pain           Vitals:    11/18/18 1245 11/18/18 1300 11/18/18 1325 11/18/18 1509   BP:  166/79 (!) 174/79 140/80   Pulse: 93 90 83 80   Patient Position - Orthostatic VS: Lying Lying       Visual Acuity      ED Medications  Medications   gabapentin (NEURONTIN) capsule 300 mg (300 mg Oral Given 11/18/18 1717)   levothyroxine tablet 100 mcg (100 mcg Oral Given 11/18/18 1458)   ondansetron (ZOFRAN) injection 4 mg (not administered)   heparin (porcine) subcutaneous injection 5,000 Units (5,000 Units Subcutaneous Given 11/18/18 1500)   acetaminophen (TYLENOL) tablet 650 mg (not administered)   cefTRIAXone (ROCEPHIN) IVPB (premix) 1,000 mg (not administered)   methylPREDNISolone sodium succinate (Solu-MEDROL) injection 80 mg (80 mg Intravenous Given 11/18/18 1049)   acetaminophen (TYLENOL) tablet 650 mg (650 mg Oral Given 11/18/18 1049)   cefTRIAXone (ROCEPHIN) IVPB (premix) 1,000 mg (0 mg Intravenous Stopped 11/18/18 1202)   sodium chloride 0 9 % bolus 500 mL (500 mL Intravenous New Bag 11/18/18 1109)   sodium chloride 0 9 % infusion (125 mL/hr Intravenous New Bag 11/18/18 1455)       Diagnostic Studies  Results Reviewed     Procedure Component Value Units Date/Time    Troponin I [[id number]]  (Normal) Collected:  11/18/18 1207    Lab Status:  Final result Specimen:  Blood from Arm, Left Updated:  11/18/18 1236     Troponin I <0 03 ng/mL     Sedimentation rate, automated [[id number]]  (Abnormal) Collected:  11/18/18 1003    Lab Status:  Final result Specimen:  Blood from Arm, Right Updated:  11/18/18 1123     Sed Rate 92 (H) mm/hour     TSH [[id number]]  (Normal) Collected:  11/18/18 1003    Lab Status:  Final result Specimen:  Blood from Arm, Right Updated:  11/18/18 1105     TSH 3RD GENERATON 2 290 uIU/mL     Narrative:         Patients undergoing fluorescein dye angiography may retain small amounts of fluorescein in the body for 48-72 hours post procedure  Samples containing fluorescein can produce falsely depressed TSH values  If the patient had this procedure,a specimen should be resubmitted post fluorescein clearance            The recommended reference ranges for TSH during pregnancy are as follows:  First trimester 0 1 to 2 5 uIU/mL  Second trimester  0 2 to 3 0 uIU/mL  Third trimester 0 3 to 3 0 uIU/m      Urine Microscopic [[id number]]  (Abnormal) Collected:  11/18/18 1017    Lab Status:  Final result Specimen:  Urine from Urine, Straight Cath Updated:  11/18/18 1052     RBC, UA 1-2 (A) /hpf      WBC, UA 20-30 (A) /hpf      Epithelial Cells Occasional /hpf      Bacteria, UA Innumerable (A) /hpf     Urine culture [[id number]] Collected:  11/18/18 1017    Lab Status: In process Specimen:  Urine from Urine, Straight Cath Updated:  11/18/18 1052    Comprehensive metabolic panel [[id number]]  (Abnormal) Collected:  11/18/18 1003    Lab Status:  Final result Specimen:  Blood from Arm, Right Updated:  11/18/18 1044     Sodium 138 mmol/L      Potassium 4 2 mmol/L      Chloride 101 mmol/L      CO2 30 mmol/L      ANION GAP 7 mmol/L      BUN 13 mg/dL      Creatinine 0 61 mg/dL      Glucose 123 (H) mg/dL      Calcium 8 8 mg/dL      AST 17 U/L      ALT 10 U/L      Alkaline Phosphatase 53 (L) U/L      Total Protein 6 9 g/dL      Albumin 3 2 (L) g/dL      Total Bilirubin 0 90 mg/dL      eGFR 77 ml/min/1 73sq m     Narrative:         National Kidney Disease Education Program recommendations are as follows:  GFR calculation is accurate only with a steady state creatinine  Chronic Kidney disease less than 60 ml/min/1 73 sq  meters  Kidney failure less than 15 ml/min/1 73 sq  meters      Magnesium [[id number]]  (Normal) Collected:  11/18/18 1003    Lab Status:  Final result Specimen:  Blood from Arm, Right Updated:  11/18/18 1044     Magnesium 2 1 mg/dL     Lipase [[id number]]  (Normal) Collected:  11/18/18 1003    Lab Status:  Final result Specimen:  Blood from Arm, Right Updated:  11/18/18 1044     Lipase 16 u/L     CK (with reflex to MB) [[id number]]  (Abnormal) Collected:  11/18/18 1003    Lab Status:  Final result Specimen:  Blood from Arm, Right Updated:  11/18/18 1044     Total CK 22 (L) U/L     UA w Reflex to Microscopic w Reflex to Culture [[id number]]  (Abnormal) Collected:  11/18/18 1017    Lab Status:  Final result Specimen:  Urine from Urine, Straight Cath Updated:  11/18/18 1036     Color, UA Yellow     Clarity, UA Slightly Cloudy (A)     Specific Gravity, UA 1 025     pH, UA 6 0     Leukocytes, UA Trace (A)     Nitrite, UA Positive (A)     Protein, UA 1+ (A) mg/dl      Glucose, UA Negative mg/dl      Ketones, UA Negative mg/dl      Urobilinogen, UA 1 0 E U /dl      Bilirubin, UA Negative     Blood, UA 1+ (A)    CBC and differential [[id number]]  (Abnormal) Collected:  11/18/18 1003    Lab Status:  Final result Specimen:  Blood from Arm, Right Updated:  11/18/18 1031     WBC 9 10 Thousand/uL      RBC 3 61 (L) Million/uL      Hemoglobin 11 6 (L) g/dL      Hematocrit 34 8 %      MCV 97 fL      MCH 32 0 pg      MCHC 33 2 g/dL      RDW 17 8 (H) %      MPV 9 1 fL      Platelets 492 Thousands/uL      nRBC 0 /100 WBCs      Neutrophils Relative 85 (H) %      Lymphocytes Relative 6 (L) %      Monocytes Relative 8 %      Eosinophils Relative 1 %      Basophils Relative 1 %      Neutrophils Absolute 7 70 (H) Thousands/µL      Lymphocytes Absolute 0 50 (L) Thousands/µL      Monocytes Absolute 0 70 Thousand/µL      Eosinophils Absolute 0 00 Thousand/µL      Basophils Absolute 0 10 Thousands/µL                  XR chest 1 view portable   Final Result by Ana Luisa Farmer (11/18 1100)   Large hiatal hernia  Mild diffuse right pleural thickening  No visible   active cardiopulmonary pathology           Signed by Renaldo Deutsch MD                 Procedures  ECG 12 Lead Documentation  Date/Time: 11/18/2018 10:03 AM  Performed by: Elmer Vela  Authorized by: Elmer Vela     ECG reviewed by me, the ED Provider: yes    Patient location:  ED  Comments:      NSR at 88, no acute-appearing ST-T wave changes, no PVCs           Phone Contacts  ED Phone Contact    ED Course  ED Course as of Nov 18 1821   Sun Nov 18, 2018   1242 Stable; has UTI so Rocepin IV given; also has elevated ESR (=92) of unclear significane; discussed w/patient and daughter, patient too weak to be at home, is fall risk, will admit                                 MDM  Number of Diagnoses or Management Options  Diagnosis management comments: Initial Impression: general weakness and arthralgias, no documented fever or focal complaints, no AMS, no fall/trauma; will screen broadly w/CXR, labs/urine, give Tylenol PO and solumedrol IV (for anti-inflammatory), then re-evaluate    CritCare Time    Disposition  Final diagnoses:   UTI (urinary tract infection)   Joint pain     Time reflects when diagnosis was documented in both MDM as applicable and the Disposition within this note     Time User Action Codes Description Comment    11/18/2018 12:43 PM Raimundo Neat Add [N39 0] UTI (urinary tract infection)     11/18/2018 12:43 PM Raimundo Neat Add [M25 50] Joint pain       ED Disposition     ED Disposition Condition Comment    Admit  Case was discussed with Dr Karlo Strong and the patient's admission status was agreed to be Admission Status: inpatient status to the service of Dr Karlo Strong   Follow-up Information    None         Current Discharge Medication List      CONTINUE these medications which have NOT CHANGED    Details   gabapentin (NEURONTIN) 300 mg capsule Take 1 capsule (300 mg total) by mouth 2 (two) times a day  Qty: 60 capsule, Refills: 3    Associated Diagnoses: Compression fracture of thoracic vertebra, closed, initial encounter ([facility] [address] );  Lumbar strain, initial encounter      levothyroxine 100 mcg tablet Take 1 tablet (100 mcg total) by mouth daily  Qty: 90 tablet, Refills: 3    Associated Diagnoses: Hypothyroidism, unspecified type      HYDROcodone-acetaminophen (NORCO) 7 5-325 mg per tablet Take 1 tablet by mouth daily Earliest Fill Date: 10/10/18 Max Daily Amount: 1 tablet  Qty: 30 tablet, Refills: 0    Associated Diagnoses: Chronic low back pain without sciatica, unspecified back pain laterality           No discharge procedures on file      ED Provider  Electronically Signed by           Jacob Womack MD  11/18/18 0004

## 2018-11-18 NOTE — H&P
H&P- Floyd Potter 12/30/1921, 80 y o  female MRN: [id number]    Unit/Bed#: -01 Encounter: [id number]    Primary Care Provider: Padmini Schaeffer MD   Date and time admitted to hospital: 11/18/2018  9:22 AM        Acute cystitis with hematuria   Assessment & Plan    · 1 liter of NSS only  · Continue Rocephin daily     Sepsis ([address] )   Assessment & Plan    · As evidence by tachycardia, tachypnea, hypertension, hypoxia related to UTI  · Urinalysis is positive for cloudy, trace leukocytes, positive nitrites, 1+ proteins, 1 + blood  Weakness generalized   Assessment & Plan    · Chronic left foot drop and poor ambulatory status       Hypothyroid   Assessment & Plan    · Continue synthroid  · Check TSH     Hypercholesterolemia   Assessment & Plan    · Diet controlled     Alopecia   Assessment & Plan    · Chronic      Ambulatory dysfunction   Assessment & Plan    · Patient is not usually ambulatory, but does have a standard walker at home  · PT/OT           VTE Prophylaxis: Heparin  Code Status:  DNR DNI  POLST: POLST is not applicable to this patient  Discussion with family:  Use of IV antibiotics for urinary tract infection    Anticipated Length of Stay:  Patient will be admitted on an Inpatient basis with an anticipated length of stay of  > 2 midnights  Justification for Hospital Stay:  Continued IV antibiotics    Total Time for Visit, including Counseling / Coordination of Care: 70   Greater than 50% of this total time spent on direct patient counseling and coordination of care  Chief Complaint:   Generalized weakness    History of Present Illness:    Floyd Potter is a 80 y o  female who presents with generalized weakness  Patient states that she has had increasing weakness of her lower extremities for approximately 2 weeks    She states that she has been having diffused all over body nerve pain and has been taking Neurontin 300 mg twice a day but it does not seem to be doing anything for her     In June she fell and had hurt her back, she does have a left hip fracture from 13 years ago and a left ankle fracture at the age of 61 that do cause her issues with ambulation  She was subsequently found to have urinary tract infection and an elevated sed rate  Review of Systems:  Review of Systems   Constitutional: Positive for activity change  HENT: Negative  Eyes: Negative  Respiratory: Negative  Cardiovascular: Negative  Gastrointestinal: Negative  Endocrine: Negative  Genitourinary: Negative  Musculoskeletal: Negative  Skin: Negative  Allergic/Immunologic: Negative  Neurological: Positive for weakness  Hematological: Negative  Psychiatric/Behavioral: Negative  Past Medical and Surgical History:   Past Medical History:   Diagnosis Date    Hypothyroid        Past Surgical History:   Procedure Laterality Date    ANKLE SURGERY Left 11/06/2006    ORIF HIP FRACTURE Left 11/2006       Meds/Allergies:  Prior to Admission medications    Medication Sig Start Date End Date Taking? Authorizing Provider   gabapentin (NEURONTIN) 300 mg capsule Take 1 capsule (300 mg total) by mouth 2 (two) times a day 10/10/18   Erasmo Bethea MD   HYDROcodone-acetaminophen [facility] 7 5-325 mg per tablet Take 1 tablet by mouth daily Earliest Fill Date: 10/10/18 Max Daily Amount: 1 tablet 10/10/18   Erasmo Bethea MD   levothyroxine 100 mcg tablet Take 1 tablet (100 mcg total) by mouth daily 7/11/18   Erasmo Bethea MD     I have reviewed home medications with patient personally  Allergies: Allergies   Allergen Reactions    Aspirin     Fosamax [Alendronate]     Lipitor [Atorvastatin]     Promethazine     Vioxx [Rofecoxib]        Social History:  Marital Status:     Occupation:  Retired  Patient Pre-hospital Living Situation:  At home  Patient Pre-hospital Level of Mobility:  Minimal  Patient Pre-hospital Diet Restrictions:  Regular  Substance Use History:     History Alcohol Use No     History   Smoking Status    Never Smoker   Smokeless Tobacco    Never Used     History   Drug Use No       Family History:  I have reviewed the patients family history    Physical Exam:   Vitals:   Blood Pressure: (!) 174/79 (11/18/18 1325)  Pulse: 83 (11/18/18 1325)  Temperature: 98 1 °F (36 7 °C) (11/18/18 1325)  Temp Source: Tympanic (11/18/18 1325)  Respirations: 16 (11/18/18 1325)  Height: 5' 1" (154 9 cm) (11/18/18 1328)  Weight - Scale: 69 9 kg (154 lb) (11/18/18 0947)  SpO2: 93 % (11/18/18 1325)    Physical Exam   Constitutional: She is oriented to person, place, and time  Vital signs are normal  She appears well-developed and well-nourished  She is cooperative  HENT:   Head: Normocephalic and atraumatic  Nose: Nose normal    Mouth/Throat: Mucous membranes are normal    Eyes: Pupils are equal, round, and reactive to light  Conjunctivae and EOM are normal    Neck: Normal range of motion and full passive range of motion without pain  Neck supple  Cardiovascular: Normal rate, regular rhythm, normal heart sounds and normal pulses  Pulmonary/Chest: Effort normal and breath sounds normal    Abdominal: Soft  Normal appearance and bowel sounds are normal    Musculoskeletal:        Feet:    Generalized weakness   Neurological: She is alert and oriented to person, place, and time  Skin: Skin is warm  Psychiatric: She has a normal mood and affect  Her speech is normal and behavior is normal        Additional Data:   Lab Results: I have personally reviewed pertinent reports          Results from last 7 days  Lab Units 11/18/18  1003   WBC Thousand/uL 9 10   HEMOGLOBIN g/dL 11 6*   HEMATOCRIT % 34 8   PLATELETS Thousands/uL 331   NEUTROS PCT % 85*   LYMPHS PCT % 6*   MONOS PCT % 8   EOS PCT % 1       Results from last 7 days  Lab Units 11/18/18  1003   POTASSIUM mmol/L 4 2   CHLORIDE mmol/L 101   CO2 mmol/L 30   BUN mg/dL 13   CREATININE mg/dL 0 61   CALCIUM mg/dL 8 8   ALK PHOS U/L 53* ALT U/L 10   AST U/L 17                   Imaging: I have personally reviewed pertinent reports  XR chest 1 view portable   Final Result by Mariam Dubon (11/18 1100)   Large hiatal hernia  Mild diffuse right pleural thickening  No visible   active cardiopulmonary pathology  Signed by Yisroel Canavan, MD          EKG, Pathology, and Other Studies Reviewed on Admission:   · EKG:  Normal sinus rhythm with a heart rate of 88    [facility] Records Reviewed: Yes     ** Please Note: This note has been constructed using a voice recognition system   **

## 2018-11-18 NOTE — ASSESSMENT & PLAN NOTE
· As evidence by tachycardia, tachypnea, hypertension, hypoxia related to UTI  · Urinalysis is positive for cloudy, trace leukocytes, positive nitrites, 1+ proteins, 1 + blood

## 2018-11-18 NOTE — PLAN OF CARE
Problem: DISCHARGE PLANNING - CARE MANAGEMENT  Goal: Discharge to post-acute care or home with appropriate resources  INTERVENTIONS:  - Conduct assessment to determine patient/family and health care team treatment goals, and need for post-acute services based on payer coverage, community resources, and patient preferences, and barriers to discharge  - Address psychosocial, clinical, and financial barriers to discharge as identified in assessment in conjunction with the patient/family and health care team  - Arrange appropriate level of post-acute services according to patient's   needs and preference and payer coverage in collaboration with the physician and health care team  - Communicate with and update the patient/family, physician, and health care team regarding progress on the discharge plan  - Arrange appropriate transportation to post-acute venues  Pt will need a PT evaluation to determine a discharge plan  Pt lives with her daughter who provides most of her care    Outcome: Progressing

## 2018-11-19 LAB
ALBUMIN SERPL BCP-MCNC: 2.9 G/DL (ref 3.5–5.7)
ALP SERPL-CCNC: 48 U/L (ref 55–165)
ALT SERPL W P-5'-P-CCNC: 9 U/L (ref 7–52)
ANION GAP SERPL CALCULATED.3IONS-SCNC: 7 MMOL/L (ref 4–13)
AST SERPL W P-5'-P-CCNC: 14 U/L (ref 13–39)
BASOPHILS # BLD AUTO: 0 THOUSANDS/ΜL (ref 0–0.1)
BASOPHILS NFR BLD AUTO: 0 % (ref 0–2)
BILIRUB SERPL-MCNC: 0.5 MG/DL (ref 0.2–1)
BUN SERPL-MCNC: 17 MG/DL (ref 7–25)
CALCIUM SERPL-MCNC: 8.4 MG/DL (ref 8.6–10.5)
CHLORIDE SERPL-SCNC: 106 MMOL/L (ref 98–107)
CO2 SERPL-SCNC: 26 MMOL/L (ref 21–31)
CREAT SERPL-MCNC: 0.58 MG/DL (ref 0.6–1.2)
EOSINOPHIL # BLD AUTO: 0 THOUSAND/ΜL (ref 0–0.61)
EOSINOPHIL NFR BLD AUTO: 0 % (ref 0–5)
ERYTHROCYTE [DISTWIDTH] IN BLOOD BY AUTOMATED COUNT: 17.9 % (ref 11.5–14.5)
GFR SERPL CREATININE-BSD FRML MDRD: 78 ML/MIN/1.73SQ M
GLUCOSE SERPL-MCNC: 140 MG/DL (ref 65–99)
HCT VFR BLD AUTO: 34.1 % (ref 34.8–46.1)
HGB BLD-MCNC: 10.9 G/DL (ref 12–16)
LYMPHOCYTES # BLD AUTO: 0.7 THOUSANDS/ΜL (ref 0.6–4.47)
LYMPHOCYTES NFR BLD AUTO: 11 % (ref 21–51)
MAGNESIUM SERPL-MCNC: 2.2 MG/DL (ref 1.9–2.7)
MCH RBC QN AUTO: 31.2 PG (ref 26–34)
MCHC RBC AUTO-ENTMCNC: 32 G/DL (ref 31–37)
MCV RBC AUTO: 97 FL (ref 81–99)
MONOCYTES # BLD AUTO: 0.4 THOUSAND/ΜL (ref 0.17–1.22)
MONOCYTES NFR BLD AUTO: 6 % (ref 2–12)
NEUTROPHILS # BLD AUTO: 5.2 THOUSANDS/ΜL (ref 1.4–6.5)
NEUTS SEG NFR BLD AUTO: 83 % (ref 42–75)
NRBC BLD AUTO-RTO: 0 /100 WBCS
PLATELET # BLD AUTO: 330 THOUSANDS/UL (ref 149–390)
PMV BLD AUTO: 9.4 FL (ref 8.6–11.7)
POTASSIUM SERPL-SCNC: 4 MMOL/L (ref 3.5–5.5)
PROT SERPL-MCNC: 6.3 G/DL (ref 6.4–8.9)
RBC # BLD AUTO: 3.5 MILLION/UL (ref 3.9–5.2)
SODIUM SERPL-SCNC: 139 MMOL/L (ref 134–143)
WBC # BLD AUTO: 6.3 THOUSAND/UL (ref 4.8–10.8)

## 2018-11-19 PROCEDURE — 99232 SBSQ HOSP IP/OBS MODERATE 35: CPT | Performed by: HOSPITALIST

## 2018-11-19 PROCEDURE — G8988 SELF CARE GOAL STATUS: HCPCS

## 2018-11-19 PROCEDURE — G8987 SELF CARE CURRENT STATUS: HCPCS

## 2018-11-19 PROCEDURE — 97116 GAIT TRAINING THERAPY: CPT

## 2018-11-19 PROCEDURE — 97163 PT EVAL HIGH COMPLEX 45 MIN: CPT

## 2018-11-19 PROCEDURE — G8978 MOBILITY CURRENT STATUS: HCPCS

## 2018-11-19 PROCEDURE — 97167 OT EVAL HIGH COMPLEX 60 MIN: CPT

## 2018-11-19 PROCEDURE — 85025 COMPLETE CBC W/AUTO DIFF WBC: CPT | Performed by: NURSE PRACTITIONER

## 2018-11-19 PROCEDURE — 80053 COMPREHEN METABOLIC PANEL: CPT | Performed by: NURSE PRACTITIONER

## 2018-11-19 PROCEDURE — 83735 ASSAY OF MAGNESIUM: CPT | Performed by: NURSE PRACTITIONER

## 2018-11-19 PROCEDURE — G8979 MOBILITY GOAL STATUS: HCPCS

## 2018-11-19 RX ADMIN — HEPARIN SODIUM 5000 UNITS: 5000 INJECTION INTRAVENOUS; SUBCUTANEOUS at 21:04

## 2018-11-19 RX ADMIN — CEFTRIAXONE 1000 MG: 1 INJECTION, SOLUTION INTRAVENOUS at 12:22

## 2018-11-19 RX ADMIN — HEPARIN SODIUM 5000 UNITS: 5000 INJECTION INTRAVENOUS; SUBCUTANEOUS at 06:09

## 2018-11-19 RX ADMIN — LEVOTHYROXINE SODIUM 100 MCG: 100 TABLET ORAL at 08:11

## 2018-11-19 RX ADMIN — HEPARIN SODIUM 5000 UNITS: 5000 INJECTION INTRAVENOUS; SUBCUTANEOUS at 14:48

## 2018-11-19 RX ADMIN — GABAPENTIN 300 MG: 300 CAPSULE ORAL at 17:49

## 2018-11-19 RX ADMIN — GABAPENTIN 300 MG: 300 CAPSULE ORAL at 08:11

## 2018-11-19 NOTE — PHYSICIAN ADVISOR
Current patient class: Inpatient  The patient is currently on Hospital Day: 2 at [address]      The patient was admitted to the hospital at 0484 31 29 02 on 11/18/18 for the following diagnosis:  Joint pain [M25 50]  UTI (urinary tract infection) [N39 0]  Weakness generalized [R53 1]       There is documentation in the medical record of an expected length of stay of at least 2 midnights  The patient is therefore expected to satisfy the 2 midnight benchmark and given the 2 midnight presumption is appropriate for INPATIENT ADMISSION  Given this expectation of a satisfying stay, CMS instructs us that the patient is most often appropriate for inpatient admission under part A provided medical necessity is documented in the chart  After review of the relevant documentation, labs, vital signs and test results, the patient is appropriate for INPATIENT ADMISSION  Admission to the hospital as an inpatient is a complex decision making process which requires the practitioner to consider the patients presenting complaint, history and physical examination and all relevant testing  With this in mind, in this case, the patient was deemed appropriate for INPATIENT ADMISSION  After review of the documentation and testing available at the time of the admission I concur with this clinical determination of medical necessity  Rationale is as follows: The patient is a 80 yrs old Female who presented to the ED at 11/18/2018  9:22 AM with a chief complaint of Weakness - Generalized (generalized weakness for 2 weeks, accompanied by diffuse pain  pt seen here 2 weeks ago, sent home  pt also reports a fall in june with hip fx and back injury)     The patient presented with  Generalized weakness  The patient is being admitted with acute cystitis, sepsis and generalized weakness  The plan of care include urine culture, IV anx, PT/OT consultation   This patient is appropriate for INPATIENT admission The patients vitals on arrival were ED Triage Vitals [11/18/18 0947]   Temperature Pulse Respirations Blood Pressure SpO2   98 9 °F (37 2 °C) 91 (!) 41 138/63 95 %      Temp Source Heart Rate Source Patient Position - Orthostatic VS BP Location FiO2 (%)   Temporal Monitor Lying Left arm --      Pain Score       Worst Possible Pain           Past Medical History:   Diagnosis Date    Hypothyroid      Past Surgical History:   Procedure Laterality Date    ANKLE SURGERY Left 11/06/2006    ORIF HIP FRACTURE Left 11/2006           Consults have been placed to:   IP CONSULT TO CASE MANAGEMENT    Vitals:    11/18/18 1509 11/18/18 2253 11/19/18 0647 11/19/18 1500   BP: 140/80 (!) 142/103 155/72 126/64   BP Location: Right arm Left arm Left arm Right arm   Pulse: 80 89 84 84   Resp: 16 16 18 16   Temp: 98 7 °F (37 1 °C) 97 6 °F (36 4 °C) 98 7 °F (37 1 °C) 99 7 °F (37 6 °C)   TempSrc: Tympanic Tympanic Tympanic Tympanic   SpO2: 92% 90% 92% 93%   Weight:       Height:           Most recent labs:    Recent Labs      11/18/18   1003   11/18/18   1945  11/19/18   0435   WBC  9 10   --    --   6 30   HGB  11 6*   --    --   10 9*   HCT  34 8   --    --   34 1*   PLT  331   < >   --   330   K  4 2   --    --   4 0   CALCIUM  8 8   --    --   8 4*   BUN  13   --    --   17   CREATININE  0 61   --    --   0 58*   LIPASE  16   --    --    --    TROPONINI   --    < >  <0 03   --    CKTOTAL  22*   --    --    --    AST  17   --    --   14   ALT  10   --    --   9   ALKPHOS  53*   --    --   48*    < > = values in this interval not displayed         Scheduled Meds:  Current Facility-Administered Medications:  acetaminophen 650 mg Oral Q6H PRN GIO Doss    cefTRIAXone 1,000 mg Intravenous Q24H GIO Doss Last Rate: 1,000 mg (11/19/18 1222)   gabapentin 300 mg Oral BID GIO Doss    heparin (porcine) 5,000 Units Subcutaneous Q8H [facility] GIO Doss    levothyroxine 100 mcg Oral Daily Michael Whalen GIO Bhatti    ondansetron 4 mg Intravenous Q6H PRN GIO Doss      Continuous Infusions:   PRN Meds:   acetaminophen    ondansetron    Surgical procedures (if appropriate):

## 2018-11-19 NOTE — PLAN OF CARE
Problem: PHYSICAL THERAPY ADULT  Goal: Performs mobility at highest level of function for planned discharge setting  See evaluation for individualized goals  Treatment/Interventions: Functional transfer training, LE strengthening/ROM, Elevations, Therapeutic exercise, Endurance training, Patient/family training, Equipment eval/education, Bed mobility, Gait training, Spoke to nursing, OT          See flowsheet documentation for full assessment, interventions and recommendations  Outcome: Progressing  Prognosis: Fair  Problem List: Decreased strength, Decreased endurance, Decreased mobility, Impaired balance  Assessment: Pt seen for PT treatment session this date with interventions consisting of gait training w/ emphasis on improving pt's ability to ambulate level surfaces x 20 feet x 1 with min A provided by therapist with RW  Pt agreeable to PT treatment session upon arrival, pt found supine in bed w/ HOB elevated, in no apparent distress  In comparison to previous session, pt with improvements in ambulatory distance & tolerance  Post session: pt returned BTB and all needs in reach Continue to recommend STR vs  Home PT at time of d/c in order to maximize pt's functional independence and safety w/ mobility  Pt continues to be functioning below baseline level, and remains limited 2* factors listed above and including decreased strength, endurance & safe independent functional mobility  PT will continue to see pt while here in order to address the deficits listed above and provide interventions consistent w/ POC in effort to achieve STGs  Barriers to Discharge: Inaccessible home environment     Recommendation: Short-term skilled PT, Home PT     PT - OK to Discharge: Yes (when med cleared to STR)    See flowsheet documentation for full assessment     Paul Rg, PTA

## 2018-11-19 NOTE — UTILIZATION REVIEW
Initial Clinical Review    Admission: Date/Time/Statement: 11/18/18 @ 0484 31 29 02     Orders Placed This Encounter   Procedures    Inpatient Admission (expected length of stay for this patient is greater than two midnights)     Standing Status:   Standing     Number of Occurrences:   1     Order Specific Question:   Admitting Physician     Answer:   Severo Samano     Order Specific Question:   Level of Care     Answer:   Med Surg [16]     Order Specific Question:   Estimated length of stay     Answer:   More than 2 Midnights     Order Specific Question:   Certification     Answer:   I certify that inpatient services are medically necessary for this patient for a duration of greater than two midnights  See H&P and MD Progress Notes for additional information about the patient's course of treatment  ED: Date/Time/Mode of Arrival:   ED Arrival Information     Expected Arrival Acuity Means of Arrival Escorted By Service Admission Type    - 11/18/2018 09:17 Urgent Ambulance [facility] Urgent    Arrival Complaint    weakness          Chief Complaint:   Chief Complaint   Patient presents with    Weakness - Generalized     generalized weakness for 2 weeks, accompanied by diffuse pain  pt seen here 2 weeks ago, sent home  pt also reports a fall in june with hip fx and back injury       History of Illness:    80 y o  female who presents with generalized weakness     increasing weakness of her lower extremities for approximately 2 weeks    She states that she has been having diffused all over body nerve pain and has been taking Neurontin 300     11/18/18 1215  --  92   34  --  90 %  --  --   11/18/18 1200  --  87   43  157/65  91 %  --  --   11/18/18 1145  --  85   32  --  93 %  --  --   11/18/18 1130  --  83   33  126/54  92 %  --  --   11/18/18 1115  --  84   29  --  93 %  --  --   11/18/18 1015  --  92   43  --   83 %  --  --   11/18/18 0947  98 9 °F (37 2 °C)  91   41  138/63  95 %  None (Room air)  Lying     69 9 kg (154 lb)    Abnormal Labs/Diagnostic Test Results:  gfr  77    78  Total CK  22  Trop  <0 03 (x3)   hgb  11 6    10 9  hct  34 8    34 1  Wbc  9 10   6 30     Sed rate  92  UA   sg  1 025,  Pos nitrite, 20-30 wbc,  innum bacteria     EKG -  Normal sinus rhythm  CXR -  Large hiatal hernia  Mild diffuse right pleural thickening  No visible active cardiopulmonary pathology        ED Treatment:   Medication Administration from 11/18/2018 0917 to 11/18/2018 1316       Date/Time Order Dose Route Action Action by Comments     11/18/2018 1049 methylPREDNISolone sodium succinate (Solu-MEDROL) injection 80 mg 80 mg Intravenous Given Elise Delgado RN      11/18/2018 1049 acetaminophen (TYLENOL) tablet 650 mg 650 mg Oral Given Elise Delgado RN                 11/18/2018 1109 cefTRIAXone (ROCEPHIN) IVPB (premix) 1,000 mg 1,000 mg Intravenous New Bag Elise Delgado RN      11/18/2018 1109 sodium chloride 0 9 % bolus 500 mL 500 mL Intravenous New Bag Elise Delgado RN           Past Medical/Surgical History:    Active Ambulatory Problems     Diagnosis Date Noted    Hypercholesterolemia 06/27/2018    Hypothyroid 06/27/2018    S/p left hip fracture 10/10/2018    Fracture dislocation of left ankle 10/10/2018    Alopecia 11/18/2018    Ambulatory dysfunction 11/18/2018    UTI (urinary tract infection) 11/18/2018     Resolved Ambulatory Problems     Diagnosis Date Noted    No Resolved Ambulatory Problems     Past Medical History:   Diagnosis Date    Hypothyroid        Admitting Diagnosis: Joint pain [M25 50]  UTI (urinary tract infection) [N39 0]  Weakness generalized [R53 1]    Assessment/Plan:   Acute cystitis w/hematuria -  Cont IV  rocephin daily  Sepsis  Evidenced by tachycardia, tachypnea -  related to UTI;  UA pos for nitrite, cloudy, blood  Generalized weakness/  Ambulatory dysfuntion -  PT/OT   Hypothyroid - cont synthroid, Ck TSH     VTE Prophylaxis: Heparin  Patient will be admitted on an Inpatient basis with an anticipated length of stay of  > 2 midnights     Justification for Hospital Stay:  Continued IV antibiotics    Admission Orders:  Admit med surg  IVF   IV rocephin   PT/OT eval and treat  Sequential compression device to b/l LE  Daily wgt;  I/O q shift  Up w/assist  Reg diet     11/19/2018  98 7   84    18   155/72    92%  RA     Afebrile, no leukocytosis  Optimize abx further based upon Urine C/S  Continue Rocephin  · Await PT and OT eval  · Patient may be a good candidate for short-term rehab whether at the acute rehab unit or at a local skilled nursing facility    Scheduled Meds:   Current Facility-Administered Medications:  acetaminophen 650 mg Oral Q6H PRN GIO Doss    cefTRIAXone 1,000 mg Intravenous Q24H GIO Doss Last Rate: 1,000 mg (11/19/18 1222)   gabapentin 300 mg Oral BID GIO Doss    heparin (porcine) 5,000 Units Subcutaneous Q8H [address] GIO Doss    levothyroxine 100 mcg Oral Daily GIO Doss    ondansetron 4 mg Intravenous Q6H PRN GIO Mendoza

## 2018-11-19 NOTE — PHYSICAL THERAPY NOTE
11/19/18 1252   Pain Assessment   Pain Assessment 0-10   Pain Score No Pain   Restrictions/Precautions   Weight Bearing Precautions Per Order No   Other Precautions Telemetry; Fall Risk   General   Family/Caregiver Present No   Cognition   Overall Cognitive Status WFL   Arousal/Participation Alert; Cooperative   Attention Within functional limits   Orientation Level Oriented X4   Memory Within functional limits   Following Commands Follows all commands and directions without difficulty   Comments pt agreeable to PT treatment   Subjective   Subjective "I'd like to try to walk  I have a regular walker at home  I don't like the ones with wheels "   Bed Mobility   Supine to Sit 4  Minimal assistance   Additional items Assist x 1;HOB elevated; Increased time required;Verbal cues   Sit to Supine 4  Minimal assistance   Additional items Assist x 1; Increased time required; Bedrails;Verbal cues   Transfers   Sit to Stand 4  Minimal assistance   Additional items Assist x 1;Bedrails; Increased time required;Verbal cues   Stand to Sit 4  Minimal assistance   Additional items Assist x 1; Increased time required;Verbal cues   Ambulation/Elevation   Gait pattern Improper Weight shift; Forward Flexion;Decreased foot clearance; Steppage; Short stride; Excessively slow   Gait Assistance 4  Minimal assist   Additional items Assist x 1;Verbal cues; Tactile cues   Assistive Device Rolling walker   Distance 20 feet x 1   Stair Management Assistance Not tested   Balance   Static Sitting Fair +   Dynamic Sitting Fair   Static Standing Fair -   Dynamic Standing Poor +   Ambulatory Poor +   Endurance Deficit   Endurance Deficit Yes   Activity Tolerance   Activity Tolerance Patient limited by fatigue   Assessment   Prognosis Fair   Problem List Decreased strength;Decreased endurance;Decreased mobility; Impaired balance   Assessment Pt seen for PT treatment session this date with interventions consisting of gait training w/ emphasis on improving pt's ability to ambulate level surfaces x 20 feet x 1 with min A provided by therapist with RW  Pt agreeable to PT treatment session upon arrival, pt found supine in bed w/ HOB elevated, in no apparent distress  In comparison to previous session, pt with improvements in ambulatory distance & tolerance  Post session: pt returned BTB and all needs in reach Continue to recommend STR vs  Home PT at time of d/c in order to maximize pt's functional independence and safety w/ mobility  Pt continues to be functioning below baseline level, and remains limited 2* factors listed above and including decreased strength, endurance & safe independent functional mobility  PT will continue to see pt while here in order to address the deficits listed above and provide interventions consistent w/ POC in effort to achieve STGs  Goals   Patient Goals to go home   Treatment Day 1   Plan   Treatment/Interventions Functional transfer training;LE strengthening/ROM; Therapeutic exercise; Endurance training;Patient/family training;Equipment eval/education; Bed mobility;Gait training   Progress Slow progress, decreased activity tolerance   PT Frequency 5x/wk   Recommendation   Recommendation Short-term skilled PT; Home PT   PT - OK to Discharge Yes  (when med cleared to STR)   Additional Comments pt in bed all needs in reach post session   Heydi Gautam, PTA

## 2018-11-19 NOTE — OCCUPATIONAL THERAPY NOTE
Occupational Therapy Evaluation      Ten Sanchez    11/19/2018    Patient Active Problem List   Diagnosis    Hypercholesterolemia    Hypothyroid    S/p left hip fracture    Fracture dislocation of left ankle    Weakness generalized    Acute cystitis with hematuria    Sepsis ([address] )    Alopecia    Ambulatory dysfunction    UTI (urinary tract infection)       Past Medical History:   Diagnosis Date    Hypothyroid        Past Surgical History:   Procedure Laterality Date    ANKLE SURGERY Left 11/06/2006    ORIF HIP FRACTURE Left 11/2006 11/19/18 1937   Note Type   Note type Eval/Treat   Restrictions/Precautions   Weight Bearing Precautions Per Order No   Braces or Orthoses (none per pt)   Other Precautions Fall Risk;Telemetry   Pain Assessment   Pain Assessment No/denies pain   Pain Score No Pain   Home Living   Type of [address] One level;Performs ADLs on one level; Able to live on main level with bedroom/bathroom;Stairs to enter with rails   Bathroom Shower/Tub Tub/shower unit   [address]  (transport chair)   Additional Comments sponge bathes   Prior Function   Level of [place] Independent with ADLs and functional mobility; Needs assistance with ADLs and functional mobility; Needs assistance with IADLs   Lives With Daughter  (is home all the time)   Receives Help From Family   ADL Assistance Needs assistance  (dtr A's pt w/ ADLs)   IADLs Needs assistance   Falls in the last 6 months 1 to 4  (1 fall in July pt reports)   Vocational Retired   Psychosocial   Psychosocial ([employer]) WDL   Subjective   Subjective agreeable to OT eval   ADL   Eating Assistance 5  Supervision/Setup   Eating Deficit Setup   Grooming Assistance 4  Minimal [address] 3  Moderate [address] Unable to assess    [address] 3  Moderate Assistance   LB Dressing Assistance Unable to assess   Toileting Assistance  Unable to assess   Bed Mobility   Supine to Sit 4  Minimal assistance   Additional items Assist x 1; Increased time required;Verbal cues;HOB elevated   Sit to Supine 4  Minimal assistance   Additional items Assist x 1;HOB elevated; Increased time required;Verbal cues   Transfers   Sit to Stand 4  Minimal assistance   Additional items Assist x 1; Increased time required;Verbal cues   Stand to Sit 4  Minimal assistance   Additional items Assist x 1; Increased time required;Verbal cues   Toilet transfer Unable to assess   Balance   Static Sitting Fair +   Dynamic Sitting Fair   Static Standing Fair -   Dynamic Standing Poor +   Ambulatory Poor +   Activity Tolerance   Activity Tolerance Patient limited by fatigue  (CEBALLOS readily with bed mobility/functional transfers)   RUE Assessment   RUE Assessment WFL   LUE Assessment   LUE Assessment WFL   Hand Function   Gross Motor Coordination Functional   Fine Motor Coordination Functional   Cognition   Overall Cognitive Status WFL   Arousal/Participation Alert; Cooperative   Attention Within functional limits   Orientation Level Oriented X4   Memory Within functional limits   Following Commands Follows all commands and directions without difficulty   Assessment   Limitation Decreased ADL status; Decreased Safe judgement during ADL;Decreased endurance;Decreased self-care trans;Decreased high-level ADLs   Prognosis Good   Assessment Pt is a 80 y o  female seen for OT evaluation s/p admit to [address] on 11/18/2018 w/ Sepsis ([facility] [address] )  Comorbidities affecting pt's functional performance at time of assessment include: Generalized Weakness, Ambulatory Dysfunction, Acute Cystitis with Hematuria, Hx Lt Hip Fx and Lt Ankle Dislocation, Hypothyroidism  Personal factors affecting pt at time of IE include:steps to enter environment, difficulty performing ADLS and difficulty performing IADLS    Prior to admission, pt was I with most self care ADL's ( A with back/buttocks/feet PRN), , shared household tasks with daughter and ambulatory in home with ETHAN rosenberg Upon evaluation: Pt requires an increased level of assistance with self care ADL's and functional transfers/toileting/mobility r/t the following deficits impacting occupational performance: weakness, decreased balance, decreased tolerance and decreased safety awareness  Pt to benefit from continued skilled OT tx while in the hospital to address deficits as defined above and maximize level of functional independence w ADL's and functional mobility  Occupational Performance areas to address include: bathing/shower, dressing and functional mobility  From OT standpoint, recommendation at time of d/c would be STR vs home wit services, depending upon status at d/c  Goals   Patient Goals go home with my daughter   STG Time Frame 3-5   Short Term Goal #1 pt will demonstrate good knowledge re: adaptations to increase ability to participate in self care   Short Term Goal #2 increase bed mobility to MI for self care participation   LTG Time Frame 7-10   Long Term Goal #1 increase functional standing tolreance to >3'-5'   Long Term Goal #2 increase self toileting to MI with good safety demonstrated   ADL Goals   Pt Will Perform Grooming With setup   Pt Will Perform Bathing With mod indep   Pt Will Perform UE Dressing With mod indep   Pt Will Perform LE Dressing With mod indep   Pt Will Perform Toileting With mod indep   Plan   Treatment Interventions Energy conservation; Activityengagement;ADL retraining;Functional transfer training; Endurance training;Patient/family training   Goal Expiration Date 11/29/18   Treatment Day 0   OT Frequency 3-5x/wk   Recommendation   OT Discharge Recommendation Short Term Rehab   Barthel Index   Feeding 5   Bathing 0   Grooming Score 0   Dressing Score 5   Bladder Score 5   Bowels Score 5   Toilet Use Score 5   Transfers (Bed/Chair) Score 10   Mobility (Level Surface) Score 0 Stairs Score 0   Barthel Index Score 35   Flora Anderson

## 2018-11-19 NOTE — ASSESSMENT & PLAN NOTE
· Chronic left foot drop and poor ambulatory status  · Await PT and OT eval  · Patient may be a good candidate for short-term rehab whether at the acute rehab unit or at a local skilled nursing facility  · Continue supportive therapy which includes gabapentin for her peripheral neuropathy

## 2018-11-19 NOTE — ASSESSMENT & PLAN NOTE
· As evidence by tachycardia, tachypnea, hypertension, hypoxia   · This is secondary to suspected UTI bacterial etiology  · Continue Rocephin day 2  · Patient is afebrile and has no leukocytosis  · Will optimize antibiotics further based upon organism identification and subsequent susceptibilities

## 2018-11-19 NOTE — PHYSICAL THERAPY NOTE
Physical Therapy Evaluation     Patient's Name: Love Schaefer    Admitting Diagnosis  Joint pain [M25 50]  UTI (urinary tract infection) [N39 0]  Weakness generalized [R53 1]    Problem List  Patient Active Problem List   Diagnosis    Hypercholesterolemia    Hypothyroid    S/p left hip fracture    Fracture dislocation of left ankle    Weakness generalized    Acute cystitis with hematuria    Sepsis ([facility] [address] )    Alopecia    Ambulatory dysfunction    UTI (urinary tract infection)       Past Medical History  Past Medical History:   Diagnosis Date    Hypothyroid        Past Surgical History  Past Surgical History:   Procedure Laterality Date    ANKLE SURGERY Left 11/06/2006    ORIF HIP FRACTURE Left 11/2006 11/19/18 0855   Note Type   Note type Eval/Treat   Pain Assessment   Pain Assessment No/denies pain   Pain Score No Pain   Home Living   Type of [address] [place] [address] Ave One level;Performs ADLs on one level; Able to live on main level with bedroom/bathroom;Stairs to enter with rails  (4 OMAR)   Bathroom Shower/Tub Tub/shower unit   Bathroom Toilet Standard   Bathroom Equipment (pt sponge bathes)   [address]  (transport chair)   Prior Function   Level of [place] Independent with ADLs and functional mobility; Needs assistance with ADLs and functional mobility; Needs assistance with IADLs   Lives With Daughter  (is home all the time)   Receives Help From Family   ADL Assistance Needs assistance  (dtr A's pt w/ ADLs)   IADLs Needs assistance   Falls in the last 6 months 1 to 4  (1 fall in July pt reports)   Vocational Retired   Restrictions/Precautions   [place] Bearing Precautions Per Order No   Braces or Orthoses (none per pt)   Other Precautions Telemetry; Fall Risk   General   Family/Caregiver Present No   Cognition   Arousal/Participation Alert   Orientation Level Oriented X4   Memory Within functional limits   Following Commands Follows all commands and directions without difficulty   Comments pt agreeable to PT session   RUE Assessment   RUE Assessment (refer to OT eval for details)   LUE Assessment   LUE Assessment (refer to OT eval for details)   RLE Assessment   RLE Assessment WFL  (at least 3/5 w/ OOB mobility observed)   LLE Assessment   LLE Assessment WFL  (at least 3/5 w/ OOB mobility observed)   Coordination   Movements are Fluid and Coordinated 1   Sensation WFL  (pt denies any N/T)   Light Touch   RLE Light Touch Grossly intact   LLE Light Touch Grossly intact   Bed Mobility   Supine to Sit 4  Minimal assistance   Additional items Assist x 1; Increased time required;Verbal cues;HOB elevated   Sit to Supine 4  Minimal assistance   Additional items Assist x 1;HOB elevated; Bedrails; Increased time required   Transfers   Sit to Stand 4  Minimal assistance   Additional items Assist x 1; Increased time required;Verbal cues   Stand to Sit 4  Minimal assistance   Additional items Assist x 1; Increased time required;Verbal cues   Stand pivot 4  Minimal assistance   Additional items Assist x 1; Increased time required;Verbal cues  (w/ RW)   Ambulation/Elevation   Gait pattern Improper Weight shift;Decreased foot clearance;Shuffling; Foward flexed; Short stride; Excessively slow; Steppage   Gait Assistance 4  Minimal assist   Additional items Assist x 1;Verbal cues; Tactile cues   Assistive Device Rolling walker   Distance 3'   Balance   Static Sitting Fair +   Dynamic Sitting Fair   Static Standing Fair -   Dynamic Standing Poor +   Ambulatory Poor +   Endurance Deficit   Endurance Deficit Yes   Activity Tolerance   Activity Tolerance Patient limited by fatigue   Medical Staff Made Aware pt w/ up w/ A order   Nurse Made Aware Yes, RN Jayden Weinstein verbalized pt appropriate for PT session, made aware of outcomes   Assessment   Prognosis Fair   Problem List Decreased strength;Decreased endurance;Decreased mobility; Impaired balance   Assessment Pt is 80 y o  female seen for PT evaluation on 11/19/2018 s/p [facility] on 11/18/2018 w/ Sepsis [facility]); pt presented to ED w/ generalized weakness  PT consulted to assess pt's functional mobility and d/c needs  Order placed for PT eval and tx, w/ up w/ A order  Performed at least 2 patient identifiers during session: Name and wristband  Comorbidities affecting pt's physical performance at time of assessment include: acute cystitis w/ hematuria, sepsis, generalized weakness, hypothyroidism, hypercholesterolemia, alopecia, ambulatory dysfunction  PTA, pt was independent w/ all functional mobility w/ RW vs  transport chair, has 4 OMAR, lives w/ dtr in 1 level home, retired and dtr A's pt w/ ADLS/IADLs  Personal factors affecting pt at time of IE include: ambulating w/ assistive device, stairs to enter home, inability to ambulate household distances, inability to navigate level surfaces w/o external assistance, unable to perform dynamic tasks in community, positive fall history, decreased initiation and engagement, limited insight into impairments, inability to perform IADLs and inability to perform ADLs  Please find objective findings from PT assessment regarding body systems outlined above with impairments and limitations including weakness, impaired balance, decreased endurance, decreased activity tolerance, decreased functional mobility tolerance and fall risk, as well as mobility assessment (tolerance to only 3 feet of ambulation and need for cueing for mobility technique)  The following objective measures performed on IE also reveal limitations: Barthel Index: 35/100 and Modified Britton: 4 (moderate/severe disability)  Pt's clinical presentation is currently unstable/unpredictable seen in pt's presentation of ongoing medical assessment  Pt to benefit from continued PT tx to address deficits as defined above and maximize level of functional independent mobility and consistency   From PT/mobility standpoint, recommendation at time of d/c would be STR vs  Home PT w/ 24/7 S/A from dtr, pending progress in order to facilitate return to PLOF  Barriers to Discharge Inaccessible home environment   Goals   Patient Goals "to return home"   [facility] Expiration Date 11/29/18   Short Term Goal #1 In 7-10 days: Increase bilateral LE strength 1/2 grade to facilitate independent mobility, Perform all bed mobility tasks with distant S to decrease caregiver burden, Perform all transfers with distant S to improve independence, Ambulate > 25 ft  with RW with distant S w/o LOB and w/ normalized gait pattern 100% of the time, Navigate 4 stairs with close S with unilateral handrail to facilitate return to previous living environment, Increase all balance 1/2 grade to decrease risk for falls, Complete exercise program independently and Tolerate 4 hr OOB to faciliate upright tolerance   Treatment Day 0   Plan   Treatment/Interventions Functional transfer training;LE strengthening/ROM; Elevations; Therapeutic exercise; Endurance training;Patient/family training;Equipment eval/education; Bed mobility;Gait training;Spoke to nursing;OT   PT Frequency 5x/wk   Recommendation   Recommendation Short-term skilled PT  (vs  HHPT w/ 24/7 S/A from dtr)   PT - OK to Discharge Yes  (when medically cleared, if to STR)   Modified Britton Scale   Modified [place] Scale 4   Barthel Index   Feeding 5   Bathing 0   Grooming Score 0   Dressing Score 5   Bladder Score 5   Bowels Score 5   Toilet Use Score 5   Transfers (Bed/Chair) Score 10   Mobility (Level Surface) Score 0   Stairs Score 0   Barthel Index Score 35         Panda Porter, PT

## 2018-11-19 NOTE — PROGRESS NOTES
Progress Note - Kathe Mackay 12/30/1921, 80 y o  female MRN: [id number]    Unit/Bed#: -01 Encounter: [id number]    Primary Care Provider: Paola Love MD   Date and time admitted to hospital: 11/18/2018  9:22 AM        * Sepsis [facility])   Assessment & Plan    · As evidence by tachycardia, tachypnea, hypertension, hypoxia   · This is secondary to suspected UTI bacterial etiology  · Continue Rocephin day 2  · Patient is afebrile and has no leukocytosis  · Will optimize antibiotics further based upon organism identification and subsequent susceptibilities     Acute cystitis with hematuria   Assessment & Plan    · Continue Rocephin day 2  · See the outline for sepsis     Weakness generalized   Assessment & Plan    · Chronic left foot drop and poor ambulatory status  · Await PT and OT eval  · Patient may be a good candidate for short-term rehab whether at the acute rehab unit or at a local skilled nursing facility  · Continue supportive therapy which includes gabapentin for her peripheral neuropathy       Hypercholesterolemia   Assessment & Plan    · Diet controlled         VTE Pharmacologic Prophylaxis: Pharmacologic: Heparin    Patient Centered Rounds: I have performed bedside rounds with nursing staff today  Discussions with Specialists or Other Care Team Provider:   Case management and nursing  Education and Discussions with Family / Patient:   No family was present at the time of my bedside evaluation    Time Spent for Care: 20 minutes  More than 50% of total time spent on counseling and coordination of care as described above      Current Length of Stay: 1 day(s)    Current Patient Status: Inpatient   Certification Statement: The patient will continue to require additional inpatient hospital stay due to The impending physical therapy and occupational therapy workup as well as possibility placement    Discharge Plan:   Hopeful discharge planning in 24 hours    Code Status: Level 3 - DNAR and DNI    Subjective:   Patient seen and examined  Patient is currently consuming breakfast    Denies pain or discomfort  Objective:     Vitals:   Temp (24hrs), Av 4 °F (36 9 °C), Min:97 6 °F (36 4 °C), Max:98 9 °F (37 2 °C)    Temp:  [97 6 °F (36 4 °C)-98 9 °F (37 2 °C)] 98 7 °F (37 1 °C)  HR:  [80-93] 84  Resp:  [16-45] 18  BP: (126-174)/() 155/72  SpO2:  [83 %-95 %] 92 %  Body mass index is 29 1 kg/m²  Input and Output Summary (last 24 hours): Intake/Output Summary (Last 24 hours) at 18 0756  Last data filed at 18 1700   Gross per 24 hour   Intake              790 ml   Output                0 ml   Net              790 ml       Physical Exam:     Physical Exam   Constitutional: She is oriented to person, place, and time  She appears well-developed and well-nourished  HENT:   Head: Normocephalic and atraumatic  Nose: Nose normal    Mouth/Throat: Oropharynx is clear and moist    Eyes: Pupils are equal, round, and reactive to light  Conjunctivae and EOM are normal    Neck: Normal range of motion  Neck supple  No JVD present  No thyromegaly present  Cardiovascular: Normal rate, regular rhythm and intact distal pulses  Exam reveals no gallop and no friction rub  No murmur heard  Pulmonary/Chest: Effort normal and breath sounds normal  No respiratory distress  Abdominal: Soft  Bowel sounds are normal  She exhibits no distension and no mass  There is no tenderness  There is no guarding  Musculoskeletal: Normal range of motion  She exhibits no edema  Lymphadenopathy:     She has no cervical adenopathy  Neurological: She is alert and oriented to person, place, and time  No cranial nerve deficit  Skin: Skin is warm  No rash noted  No erythema  Psychiatric: She has a normal mood and affect  Her behavior is normal    Vitals reviewed        Additional Data:     Labs:      Results from last 7 days  Lab Units 18  0435   WBC Thousand/uL 6 30   HEMOGLOBIN g/dL 10 9* HEMATOCRIT % 34 1*   PLATELETS Thousands/uL 330   NEUTROS PCT % 83*   LYMPHS PCT % 11*   MONOS PCT % 6   EOS PCT % 0       Results from last 7 days  Lab Units 11/19/18  0435   POTASSIUM mmol/L 4 0   CHLORIDE mmol/L 106   CO2 mmol/L 26   BUN mg/dL 17   CREATININE mg/dL 0 58*   CALCIUM mg/dL 8 4*   ALK PHOS U/L 48*   ALT U/L 9   AST U/L 14                   * I Have Reviewed All Lab Data Listed Above  * Additional Pertinent Lab Tests Reviewed: [address] Admission  Reviewed    Imaging:  Imaging Reports Reviewed Today Include:   Chest x-ray    Recent Cultures (last 7 days):           Last 24 Hours Medication List:     Current Facility-Administered Medications:  acetaminophen 650 mg Oral Q6H PRN GIO Doss   cefTRIAXone 1,000 mg Intravenous Q24H GIO Doss   gabapentin 300 mg Oral BID GIO Doss   heparin (porcine) 5,000 Units Subcutaneous Q8H [address] MarelyGIO Salazar   levothyroxine 100 mcg Oral Daily GIO Doss   ondansetron 4 mg Intravenous Q6H PRN GIO Ray        Today, Patient Was Seen By: Joanna Aragon MD    ** Please Note: Dictation voice to text software may have been used in the creation of this document   **

## 2018-11-19 NOTE — SOCIAL WORK
Discussed the POC with the rounding team   Pt was able to transfer OOB to [facility]  Pt states "I did well"  Pt indicated she is feeling much better  Pt daughter at the bedside and states the pt does not need any HHC or STR  "She works well at her own pace"  Daughter feels she can care for pt at home  Daughter would like to have [address] [facility] [place] [facility] the [facility] upon discharge  Daughter is aware there is a charge for same and is agreeable  Dr Holly Roy indicated the pt will be discharged most likely tomorrow  [facility] [place] [facility] return call

## 2018-11-19 NOTE — PLAN OF CARE
Problem: PHYSICAL THERAPY ADULT  Goal: Performs mobility at highest level of function for planned discharge setting  See evaluation for individualized goals  Treatment/Interventions: Functional transfer training, LE strengthening/ROM, Elevations, Therapeutic exercise, Endurance training, Patient/family training, Equipment eval/education, Bed mobility, Gait training, Spoke to nursing, OT          See flowsheet documentation for full assessment, interventions and recommendations  Prognosis: Fair  Problem List: Decreased strength, Decreased endurance, Decreased mobility, Impaired balance  Assessment: Pt is 80 y o  female seen for PT evaluation on 11/19/2018 s/p admit to [address] on 11/18/2018 w/ Sepsis [facility]); pt presented to ED w/ generalized weakness  PT consulted to assess pt's functional mobility and d/c needs  Order placed for PT eval and tx, w/ up w/ A order  Performed at least 2 patient identifiers during session: Name and wristband  Comorbidities affecting pt's physical performance at time of assessment include: acute cystitis w/ hematuria, sepsis, generalized weakness, hypothyroidism, hypercholesterolemia, alopecia, ambulatory dysfunction  PTA, pt was independent w/ all functional mobility w/ RW vs  transport chair, has 4 OMAR, lives w/ dtr in 1 level home, retired and dtr A's pt w/ ADLS/IADLs  Personal factors affecting pt at time of IE include: ambulating w/ assistive device, stairs to enter home, inability to ambulate household distances, inability to navigate level surfaces w/o external assistance, unable to perform dynamic tasks in community, positive fall history, decreased initiation and engagement, limited insight into impairments, inability to perform IADLs and inability to perform ADLs   Please find objective findings from PT assessment regarding body systems outlined above with impairments and limitations including weakness, impaired balance, decreased endurance, decreased activity tolerance, decreased functional mobility tolerance and fall risk, as well as mobility assessment (tolerance to only 3 feet of ambulation and need for cueing for mobility technique)  The following objective measures performed on IE also reveal limitations: Barthel Index: 35/100 and Modified Britton: 4 (moderate/severe disability)  Pt's clinical presentation is currently unstable/unpredictable seen in pt's presentation of ongoing medical assessment  Pt to benefit from continued PT tx to address deficits as defined above and maximize level of functional independent mobility and consistency  From PT/mobility standpoint, recommendation at time of d/c would be STR vs  Home PT w/ 24/7 S/A from dtr, pending progress in order to facilitate return to PLOF  Barriers to Discharge: Inaccessible home environment     Recommendation: Short-term skilled PT (vs  HHPT w/ 24/7 S/A from dtr)     PT - OK to Discharge: Yes (when medically cleared, if to STR)    See flowsheet documentation for full assessment

## 2018-11-20 VITALS
HEART RATE: 85 BPM | TEMPERATURE: 98.9 F | RESPIRATION RATE: 16 BRPM | BODY MASS INDEX: 29.07 KG/M2 | DIASTOLIC BLOOD PRESSURE: 78 MMHG | SYSTOLIC BLOOD PRESSURE: 191 MMHG | HEIGHT: 61 IN | OXYGEN SATURATION: 90 % | WEIGHT: 154 LBS

## 2018-11-20 LAB
ANION GAP SERPL CALCULATED.3IONS-SCNC: 7 MMOL/L (ref 4–13)
BACTERIA UR CULT: ABNORMAL
BASOPHILS # BLD AUTO: 0 THOUSANDS/ΜL (ref 0–0.1)
BASOPHILS NFR BLD AUTO: 1 % (ref 0–2)
BUN SERPL-MCNC: 21 MG/DL (ref 7–25)
CALCIUM SERPL-MCNC: 8.1 MG/DL (ref 8.6–10.5)
CHLORIDE SERPL-SCNC: 107 MMOL/L (ref 98–107)
CO2 SERPL-SCNC: 28 MMOL/L (ref 21–31)
CREAT SERPL-MCNC: 0.65 MG/DL (ref 0.6–1.2)
EOSINOPHIL # BLD AUTO: 0.4 THOUSAND/ΜL (ref 0–0.61)
EOSINOPHIL NFR BLD AUTO: 5 % (ref 0–5)
ERYTHROCYTE [DISTWIDTH] IN BLOOD BY AUTOMATED COUNT: 17.4 % (ref 11.5–14.5)
GFR SERPL CREATININE-BSD FRML MDRD: 75 ML/MIN/1.73SQ M
GLUCOSE SERPL-MCNC: 89 MG/DL (ref 65–99)
HCT VFR BLD AUTO: 33.4 % (ref 34.8–46.1)
HGB BLD-MCNC: 10.7 G/DL (ref 12–16)
LYMPHOCYTES # BLD AUTO: 1.4 THOUSANDS/ΜL (ref 0.6–4.47)
LYMPHOCYTES NFR BLD AUTO: 15 % (ref 21–51)
MCH RBC QN AUTO: 31.5 PG (ref 26–34)
MCHC RBC AUTO-ENTMCNC: 32.2 G/DL (ref 31–37)
MCV RBC AUTO: 98 FL (ref 81–99)
MONOCYTES # BLD AUTO: 0.5 THOUSAND/ΜL (ref 0.17–1.22)
MONOCYTES NFR BLD AUTO: 6 % (ref 2–12)
NEUTROPHILS # BLD AUTO: 7.1 THOUSANDS/ΜL (ref 1.4–6.5)
NEUTS SEG NFR BLD AUTO: 75 % (ref 42–75)
NRBC BLD AUTO-RTO: 0 /100 WBCS
PLATELET # BLD AUTO: 350 THOUSANDS/UL (ref 149–390)
PMV BLD AUTO: 9.4 FL (ref 8.6–11.7)
POTASSIUM SERPL-SCNC: 3.8 MMOL/L (ref 3.5–5.5)
RBC # BLD AUTO: 3.41 MILLION/UL (ref 3.9–5.2)
SODIUM SERPL-SCNC: 142 MMOL/L (ref 134–143)
WBC # BLD AUTO: 9.5 THOUSAND/UL (ref 4.8–10.8)

## 2018-11-20 PROCEDURE — 85025 COMPLETE CBC W/AUTO DIFF WBC: CPT | Performed by: HOSPITALIST

## 2018-11-20 PROCEDURE — 80048 BASIC METABOLIC PNL TOTAL CA: CPT | Performed by: HOSPITALIST

## 2018-11-20 PROCEDURE — 99239 HOSP IP/OBS DSCHRG MGMT >30: CPT | Performed by: HOSPITALIST

## 2018-11-20 RX ADMIN — HEPARIN SODIUM 5000 UNITS: 5000 INJECTION INTRAVENOUS; SUBCUTANEOUS at 05:52

## 2018-11-20 RX ADMIN — GABAPENTIN 300 MG: 300 CAPSULE ORAL at 08:24

## 2018-11-20 RX ADMIN — LEVOTHYROXINE SODIUM 100 MCG: 100 TABLET ORAL at 05:52

## 2018-11-20 RX ADMIN — CEFTRIAXONE 1000 MG: 1 INJECTION, SOLUTION INTRAVENOUS at 10:35

## 2018-11-20 NOTE — ASSESSMENT & PLAN NOTE
· As evidence by tachycardia, tachypnea, hypertension, hypoxia   · This is secondary to suspected UTI bacterial etiology - urine culture shows gram-negative rods  · Patient ready for discharge  · She will get her 3rd dose of ceftriaxone prior to discharge  · Patient is afebrile and has no leukocytosis  · No further outpatient antibiotics needed as an outpt

## 2018-11-20 NOTE — PLAN OF CARE
Problem: OCCUPATIONAL THERAPY ADULT  Goal: Performs self-care activities at highest level of function for planned discharge setting  See evaluation for individualized goals  Treatment Interventions: Energy conservation, Activityengagement, ADL retraining, Functional transfer training, Endurance training, Patient/family training          See flowsheet documentation for full assessment, interventions and recommendations  Outcome: Progressing  Limitation: Decreased ADL status, Decreased Safe judgement during ADL, Decreased endurance, Decreased self-care trans, Decreased high-level ADLs  Prognosis: Good  Assessment: Pt is a 80 y o  female seen for OT evaluation s/p admit to [address] on 11/18/2018 w/ Sepsis ([facility] [address] )  Comorbidities affecting pt's functional performance at time of assessment include: Generalized Weakness, Ambulatory Dysfunction, Acute Cystitis with Hematuria, Hx Lt Hip Fx and Lt Ankle Dislocation, Hypothyroidism  Personal factors affecting pt at time of IE include:steps to enter environment, difficulty performing ADLS and difficulty performing IADLS   Prior to admission, pt was I with most self care ADL's ( A with back/buttocks/feet PRN), , shared household tasks with daughter and ambulatory in home with walker, D driving  Елена Skipper Upon evaluation: Pt requires an increased level of assistance with self care ADL's and functional transfers/toileting/mobility r/t the following deficits impacting occupational performance: weakness, decreased balance, decreased tolerance and decreased safety awareness  Pt to benefit from continued skilled OT tx while in the hospital to address deficits as defined above and maximize level of functional independence w ADL's and functional mobility  Occupational Performance areas to address include: bathing/shower, dressing and functional mobility   From OT standpoint, recommendation at time of d/c would be STR vs home wit services, depending upon status at d/c        OT Discharge Recommendation: Short Term [id number]

## 2018-11-20 NOTE — DISCHARGE SUMMARY
Discharge- Rene Noguera 12/30/1921, 80 y o  female MRN: [id number]    Unit/Bed#: -01 Encounter: [id number]    Primary Care Provider: Tiny Ray MD   Date and time admitted to hospital: 11/18/2018  9:22 AM        * Sepsis [facility])   Assessment & Plan    · As evidence by tachycardia, tachypnea, hypertension, hypoxia   · This is secondary to suspected UTI bacterial etiology - urine culture shows gram-negative rods  · Patient ready for discharge  · She will get her 3rd dose of ceftriaxone prior to discharge  · Patient is afebrile and has no leukocytosis  · No further outpatient antibiotics needed as an outpt     Acute cystitis with hematuria   Assessment & Plan    · See outline above     Weakness generalized   Assessment & Plan    · Chronic left foot drop and poor ambulatory status  · Await PT and OT eval  · Patient's daughter would like to take the patient home       Hypercholesterolemia   Assessment & Plan    · Diet controlled     Hypothyroid   Assessment & Plan    · Continue synthroid           Discharging Physician / Practitioner: Michael Burdick MD  PCP: Tiny Ray MD  Admission Date:   Admission Orders     Ordered        11/18/18 1244  Inpatient Admission (expected length of stay for this patient is greater than two midnights)  Once             Discharge Date: 11/20/18    Resolved Problems  Date Reviewed: 11/18/2018    None          Consultations During Hospital Stay:  · None    Procedures Performed:   · None    Significant Findings / Test Results:   · X-ray chest - no acute pathology  · Urine culture -gram-negative rods    Incidental Findings:   · None     Test Results Pending at Discharge (will require follow up): · None     Outpatient Tests Requested:  · None    Complications:  None    Reason for Admission:   Generalized weakness    Hospital Course:     Rene Noguera is a 80 y o  female patient who originally presented to the hospital on 11/18/2018 due to generalized weakness     She was admitted to [facility]  She was found to have a UTI of bacterial etiology  Patient's initial presentation was consistent with sepsis  She was treated with 3 days of IV ceftriaxone here in the hospital    She had no fevers and or leukocytosis  She was seen by physical therapy and occupational therapy  Physical therapy recommended short-term rehab versus the possibility home with home nursing  Patient's daughter preferred to take the patient home  Patient was discharged home on all of her other pre-admission medications at the preadmission dosages  Patient's daughter was bedside at the time of my discharge evaluation, all questions answered to her satisfaction  Please see above list of diagnoses and related plan for additional information  Condition at Discharge: good     Discharge Day Visit / Exam:     Subjective:  No complaints, no distress  Vitals: Blood Pressure: (!) 191/78 (11/20/18 0642)  Pulse: 85 (11/20/18 0642)  Temperature: 98 9 °F (37 2 °C) (11/20/18 0642)  Temp Source: Tympanic (11/20/18 3479)  Respirations: 16 (11/20/18 2716)  Height: 5' 1" (154 9 cm) (11/18/18 1328)  Weight - Scale: 69 9 kg (154 lb) (11/18/18 0947)  SpO2: 90 % (11/20/18 1731)  Exam:   Physical Exam   Constitutional: She is oriented to person, place, and time  She appears well-developed and well-nourished  HENT:   Head: Normocephalic and atraumatic  Nose: Nose normal    Mouth/Throat: Oropharynx is clear and moist    Eyes: Pupils are equal, round, and reactive to light  Conjunctivae and EOM are normal    Neck: Normal range of motion  Neck supple  No JVD present  No thyromegaly present  Cardiovascular: Normal rate, regular rhythm and intact distal pulses  Exam reveals no gallop and no friction rub  No murmur heard  Pulmonary/Chest: Effort normal and breath sounds normal  No respiratory distress  Abdominal: Soft  Bowel sounds are normal  She exhibits no distension and no mass  There is no tenderness  There is no guarding  Musculoskeletal: Normal range of motion  She exhibits no edema  Lymphadenopathy:     She has no cervical adenopathy  Neurological: She is alert and oriented to person, place, and time  No cranial nerve deficit  Skin: Skin is warm  No rash noted  No erythema  Psychiatric: She has a normal mood and affect  Her behavior is normal    Vitals reviewed  Discussion with Family:   Patient's daughter was brought up to par at bedside at the time of my discharge evaluation  All questions answered to her satisfaction  Discharge instructions/Information to patient and family:   See after visit summary for information provided to patient and family  Provisions for Follow-Up Care:  See after visit summary for information related to follow-up care and any pertinent home health orders  Disposition:     Home with VNA Services (Reminder: Complete face to face encounter)    For Discharges to [facility] SNF:   · Not Applicable to this Patient - Not Applicable to this Patient    Planned Readmission:   None     Discharge Statement:  I spent 35 minutes discharging the patient  This time was spent on the day of discharge  I had direct contact with the patient on the day of discharge  Greater than 50% of the total time was spent examining patient, answering all patient questions, arranging and discussing plan of care with patient as well as directly providing post-discharge instructions  Additional time then spent on discharge activities  Discharge Medications:  See after visit summary for reconciled discharge medications provided to patient and family        ** Please Note: This note has been constructed using a voice recognition system **

## 2018-11-20 NOTE — SOCIAL WORK
Discussed the POC with the rounding team   Pt has been evaluated by Dr Renay Cardoza and is stable to be discharged  Arrangements have been made with [facility] transport for 12:00 today  Daughter is at the bedside and aware of transport time  Daughter is aware there is a cost for same  Daughter states the pt does not need any [place] [id number] services as she is at her baseline

## 2018-11-20 NOTE — PHYSICAL THERAPY NOTE
Physical Therapy Cancellation Note    Attempted to see pt for PT treatment at 8:46, pt refused & requested waiting till later  Attempt again as able      Marion Davalos, PTA

## 2018-11-20 NOTE — ASSESSMENT & PLAN NOTE
· Chronic left foot drop and poor ambulatory status  · Await PT and OT eval  · Patient's daughter would like to take the patient home

## 2018-11-20 NOTE — PLAN OF CARE
Problem: DISCHARGE PLANNING - CARE MANAGEMENT  Goal: Discharge to post-acute care or home with appropriate resources  INTERVENTIONS:  - Conduct assessment to determine patient/family and health care team treatment goals, and need for post-acute services based on payer coverage, community resources, and patient preferences, and barriers to discharge  - Address psychosocial, clinical, and financial barriers to discharge as identified in assessment in conjunction with the patient/family and health care team  - Arrange appropriate level of post-acute services according to patient's   needs and preference and payer coverage in collaboration with the physician and health care team  - Communicate with and update the patient/family, physician, and health care team regarding progress on the discharge plan  - Arrange appropriate transportation to post-acute venues  Pt will need a PT evaluation to determine a discharge plan  Pt lives with her daughter who provides most of her care  [place] Fawad return home with daughter    Outcome: Completed Date Met: 11/20/18

## 2018-11-21 ENCOUNTER — TRANSITIONAL CARE MANAGEMENT (OUTPATIENT)
Dept: FAMILY MEDICINE CLINIC | Facility: CLINIC | Age: 83
End: 2018-11-21

## 2018-11-27 ENCOUNTER — TELEPHONE (OUTPATIENT)
Dept: FAMILY MEDICINE CLINIC | Facility: CLINIC | Age: 83
End: 2018-11-27

## 2018-11-27 DIAGNOSIS — Z74.1 ASSISTANCE NEEDED FOR BATHING: Primary | ICD-10-CM

## 2018-11-27 DIAGNOSIS — Z51.5 PALLIATIVE CARE PATIENT: Primary | ICD-10-CM

## 2018-11-27 NOTE — TELEPHONE ENCOUNTER
Patients daughter called into the office and spoke with Dr Sarah Benitez regarding her mothers home health   Orlando Alcaraz is having problems

## 2018-11-27 NOTE — PROGRESS NOTES
Sherron Sheth the daughter called me informed me patient is not moving she is not eating she is not drinking much refuses to go to the emergency room and has requested comfort care for the patient  I completely agree with the daughter in past patient also herself had requested the same she is now 40-year-old I am going to approve of palliative care which may eventually be hospice care

## 2018-11-27 NOTE — PROGRESS NOTES
Assessment/Plan:    There are no diagnoses linked to this encounter  Subjective:      Patient ID: Favio Grimm is a 80 y o  female  HPI    The following portions of the patient's history were reviewed and updated as appropriate: allergies, current medications, past family history, past medical history, past social history, past surgical history and problem list       Current Outpatient Prescriptions:     gabapentin (NEURONTIN) 300 mg capsule, Take 1 capsule (300 mg total) by mouth 2 (two) times a day, Disp: 60 capsule, Rfl: 3    HYDROcodone-acetaminophen (NORCO) 7 5-325 mg per tablet, Take 1 tablet by mouth daily Earliest Fill Date: 10/10/18 Max Daily Amount: 1 tablet, Disp: 30 tablet, Rfl: 0    levothyroxine 100 mcg tablet, Take 1 tablet (100 mcg total) by mouth daily, Disp: 90 tablet, Rfl: 3    Past Medical History:   Diagnosis Date    Hypothyroid        Past Surgical History:   Procedure Laterality Date    ANKLE SURGERY Left 11/06/2006    ORIF HIP FRACTURE Left 11/2006       Social History       Patient Active Problem List   Diagnosis    Hypercholesterolemia    Hypothyroid    S/p left hip fracture    Fracture dislocation of left ankle    Weakness generalized    Acute cystitis with hematuria    Sepsis ([address] )    Alopecia    Ambulatory dysfunction    UTI (urinary tract infection)           Review of Systems      Objective: There were no vitals taken for this visit  Physical Exam        Admission on 11/18/2018, Discharged on 11/20/2018   Component Date Value Ref Range Status    TSH 3RD GENERATON 11/18/2018 2 290  0 450 - 5 330 uIU/mL Final    Using supplements with high doses of biotin 20 to more than 300 times greater than the adequate daily intake for adults of 30 mcg/day as established by the [place] of [employer], can cause falsely depress results      WBC 11/18/2018 9 10  4 80 - 10 80 Thousand/uL Final    RBC 11/18/2018 3 61* 3 90 - 5 20 Million/uL Final    Hemoglobin 11/18/2018 11 6* 12 0 - 16 0 g/dL Final    Hematocrit 11/18/2018 34 8  34 8 - 46 1 % Final    MCV 11/18/2018 97  81 - 99 fL Final    MCH 11/18/2018 32 0  26 0 - 34 0 pg Final    MCHC 11/18/2018 33 2  31 0 - 37 0 g/dL Final    RDW 11/18/2018 17 8* 11 5 - 14 5 % Final    MPV 11/18/2018 9 1  8 6 - 11 7 fL Final    Platelets 04/71/6193 331  149 - 390 Thousands/uL Final    nRBC 11/18/2018 0  /100 WBCs Final    Neutrophils Relative 11/18/2018 85* 42 - 75 % Final    Lymphocytes Relative 11/18/2018 6* 21 - 51 % Final    Monocytes Relative 11/18/2018 8  2 - 12 % Final    Eosinophils Relative 11/18/2018 1  0 - 5 % Final    Basophils Relative 11/18/2018 1  0 - 2 % Final    Neutrophils Absolute 11/18/2018 7 70* 1 40 - 6 50 Thousands/µL Final    Lymphocytes Absolute 11/18/2018 0 50* 0 60 - 4 47 Thousands/µL Final    Monocytes Absolute 11/18/2018 0 70  0 17 - 1 22 Thousand/µL Final    Eosinophils Absolute 11/18/2018 0 00  0 00 - 0 61 Thousand/µL Final    Basophils Absolute 11/18/2018 0 10  0 00 - 0 10 Thousands/µL Final    Sed Rate 11/18/2018 92* 0 - 30 mm/hour Final    Color, UA 11/18/2018 Yellow  Yellow, Straw Final    Clarity, UA 11/18/2018 Slightly Cloudy* Hazy, Clear Final    Specific Gravity, UA 11/18/2018 1 025  >1 005-<1 030 Final    pH, UA 11/18/2018 6 0  5 0-<8 0 Final    Leukocytes, UA 11/18/2018 Trace* Negative Final    Nitrite, UA 11/18/2018 Positive* Negative Final    Protein, UA 11/18/2018 1+* Negative, Interference- unable to analyze mg/dl Final    Glucose, UA 11/18/2018 Negative  Negative mg/dl Final    Ketones, UA 11/18/2018 Negative  Negative mg/dl Final    Urobilinogen, UA 11/18/2018 1 0  0 2, 1 0 E U /dl E U /dl Final    Bilirubin, UA 11/18/2018 Negative  Negative Final    Blood, UA 11/18/2018 1+* Negative Final    Sodium 11/18/2018 138  134 - 143 mmol/L Final    Potassium 11/18/2018 4 2  3 5 - 5 5 mmol/L Final    Chloride 11/18/2018 101  98 - 107 mmol/L Final    CO2 11/18/2018 30  21 - 31 mmol/L Final    ANION GAP 11/18/2018 7  4 - 13 mmol/L Final    BUN 11/18/2018 13  7 - 25 mg/dL Final    Creatinine 11/18/2018 0 61  0 60 - 1 20 mg/dL Final    Standardized to IDMS reference method    Glucose 11/18/2018 123* 65 - 99 mg/dL Final      If the patient is fasting, the ADA then defines impaired fasting glucose as > 100 mg/dL and diabetes as > or equal to 123 mg/dL  Specimen collection should occur prior to Sulfasalazine administration due to the potential for falsely depressed results  Specimen collection should occur prior to Sulfapyridine administration due to the potential for falsely elevated results   Calcium 11/18/2018 8 8  8 6 - 10 5 mg/dL Final    AST 11/18/2018 17  13 - 39 U/L Final      Specimen collection should occur prior to Sulfasalazine administration due to the potential for falsely depressed results   ALT 11/18/2018 10  7 - 52 U/L Final      Specimen collection should occur prior to Sulfasalazine administration due to the potential for falsely depressed results       Alkaline Phosphatase 11/18/2018 53* 55 - 165 U/L Final    Total Protein 11/18/2018 6 9  6 4 - 8 9 g/dL Final    Albumin 11/18/2018 3 2* 3 5 - 5 7 g/dL Final    Total Bilirubin 11/18/2018 0 90  0 20 - 1 00 mg/dL Final    eGFR 11/18/2018 77  ml/min/1 73sq m Final    Magnesium 11/18/2018 2 1  1 9 - 2 7 mg/dL Final    Lipase 11/18/2018 16  11 - 82 u/L Final    Total CK 11/18/2018 22* 30 - 192 U/L Final    RBC, UA 11/18/2018 1-2* None Seen, 0-5 /hpf Final    WBC, UA 11/18/2018 20-30* None Seen, 0-5, 5-55, 5-65 /hpf Final    Epithelial Cells 11/18/2018 Occasional  None Seen, Occasional /hpf Final    Bacteria, UA 11/18/2018 Innumerable* None Seen, Occasional /hpf Final    Ventricular Rate 11/18/2018 88  BPM Final    Atrial Rate 11/18/2018 88  BPM Final    ND Interval 11/18/2018 136  ms Final    QRSD Interval 11/18/2018 96  ms Final    QT Interval 11/18/2018 372  ms Final    QTC Interval 11/18/2018 450  ms Final    P Axis 11/18/2018 42  degrees Final    QRS Axis 11/18/2018 -36  degrees Final    T Wave [place] 11/18/2018 74  degrees Final    Urine Culture 11/18/2018 >100,000 cfu/ml Escherichia coli*  Final    Troponin I 11/18/2018 <0 03  <=0 03 ng/mL Final    TSH 3RD GENERATON 11/18/2018 1 590  0 450 - 5 330 uIU/mL Final    Using supplements with high doses of biotin 20 to more than 300 times greater than the adequate daily intake for adults of 30 mcg/day as established by the [place] of Medicine, can cause falsely depress results   Troponin I 11/18/2018 <0 03  <=0 03 ng/mL Final    Platelets 97/34/6250 321  149 - 390 Thousands/uL Final    Troponin I 11/18/2018 <0 03  <=0 03 ng/mL Final    Sodium 11/19/2018 139  134 - 143 mmol/L Final    Potassium 11/19/2018 4 0  3 5 - 5 5 mmol/L Final    Chloride 11/19/2018 106  98 - 107 mmol/L Final    CO2 11/19/2018 26  21 - 31 mmol/L Final    ANION GAP 11/19/2018 7  4 - 13 mmol/L Final    BUN 11/19/2018 17  7 - 25 mg/dL Final    Creatinine 11/19/2018 0 58* 0 60 - 1 20 mg/dL Final    Standardized to IDMS reference method    Glucose 11/19/2018 140* 65 - 99 mg/dL Final      If the patient is fasting, the ADA then defines impaired fasting glucose as > 100 mg/dL and diabetes as > or equal to 123 mg/dL  Specimen collection should occur prior to Sulfasalazine administration due to the potential for falsely depressed results  Specimen collection should occur prior to Sulfapyridine administration due to the potential for falsely elevated results   Calcium 11/19/2018 8 4* 8 6 - 10 5 mg/dL Final    AST 11/19/2018 14  13 - 39 U/L Final      Specimen collection should occur prior to Sulfasalazine administration due to the potential for falsely depressed results   ALT 11/19/2018 9  7 - 52 U/L Final      Specimen collection should occur prior to Sulfasalazine administration due to the potential for falsely depressed results       Alkaline Phosphatase 11/19/2018 48* 55 - 165 U/L Final    Total Protein 11/19/2018 6 3* 6 4 - 8 9 g/dL Final    Albumin 11/19/2018 2 9* 3 5 - 5 7 g/dL Final    Total Bilirubin 11/19/2018 0 50  0 20 - 1 00 mg/dL Final    eGFR 11/19/2018 78  ml/min/1 73sq m Final    Magnesium 11/19/2018 2 2  1 9 - 2 7 mg/dL Final    WBC 11/19/2018 6 30  4 80 - 10 80 Thousand/uL Final    RBC 11/19/2018 3 50* 3 90 - 5 20 Million/uL Final    Hemoglobin 11/19/2018 10 9* 12 0 - 16 0 g/dL Final    Hematocrit 11/19/2018 34 1* 34 8 - 46 1 % Final    MCV 11/19/2018 97  81 - 99 fL Final    MCH 11/19/2018 31 2  26 0 - 34 0 pg Final    MCHC 11/19/2018 32 0  31 0 - 37 0 g/dL Final    RDW 11/19/2018 17 9* 11 5 - 14 5 % Final    MPV 11/19/2018 9 4  8 6 - 11 7 fL Final    Platelets 54/13/7129 330  149 - 390 Thousands/uL Final    nRBC 11/19/2018 0  /100 WBCs Final    Neutrophils Relative 11/19/2018 83* 42 - 75 % Final    Lymphocytes Relative 11/19/2018 11* 21 - 51 % Final    Monocytes Relative 11/19/2018 6  2 - 12 % Final    Eosinophils Relative 11/19/2018 0  0 - 5 % Final    Basophils Relative 11/19/2018 0  0 - 2 % Final    Neutrophils Absolute 11/19/2018 5 20  1 40 - 6 50 Thousands/µL Final    Lymphocytes Absolute 11/19/2018 0 70  0 60 - 4 47 Thousands/µL Final    Monocytes Absolute 11/19/2018 0 40  0 17 - 1 22 Thousand/µL Final    Eosinophils Absolute 11/19/2018 0 00  0 00 - 0 61 Thousand/µL Final    Basophils Absolute 11/19/2018 0 00  0 00 - 0 10 Thousands/µL Final    WBC 11/20/2018 9 50  4 80 - 10 80 Thousand/uL Final    RBC 11/20/2018 3 41* 3 90 - 5 20 Million/uL Final    Hemoglobin 11/20/2018 10 7* 12 0 - 16 0 g/dL Final    Hematocrit 11/20/2018 33 4* 34 8 - 46 1 % Final    MCV 11/20/2018 98  81 - 99 fL Final    MCH 11/20/2018 31 5  26 0 - 34 0 pg Final    MCHC 11/20/2018 32 2  31 0 - 37 0 g/dL Final    RDW 11/20/2018 17 4* 11 5 - 14 5 % Final    MPV 11/20/2018 9 4  8 6 - 11 7 fL Final    Platelets 77/76/2939 350  149 - 390 Thousands/uL Final    nRBC 11/20/2018 0  /100 WBCs Final    Neutrophils Relative 11/20/2018 75  42 - 75 % Final    Lymphocytes Relative 11/20/2018 15* 21 - 51 % Final    Monocytes Relative 11/20/2018 6  2 - 12 % Final    Eosinophils Relative 11/20/2018 5  0 - 5 % Final    Basophils Relative 11/20/2018 1  0 - 2 % Final    Neutrophils Absolute 11/20/2018 7 10* 1 40 - 6 50 Thousands/µL Final    Lymphocytes Absolute 11/20/2018 1 40  0 60 - 4 47 Thousands/µL Final    Monocytes Absolute 11/20/2018 0 50  0 17 - 1 22 Thousand/µL Final    Eosinophils Absolute 11/20/2018 0 40  0 00 - 0 61 Thousand/µL Final    Basophils Absolute 11/20/2018 0 00  0 00 - 0 10 Thousands/µL Final    Sodium 11/20/2018 142  134 - 143 mmol/L Final    Potassium 11/20/2018 3 8  3 5 - 5 5 mmol/L Final    Chloride 11/20/2018 107  98 - 107 mmol/L Final    CO2 11/20/2018 28  21 - 31 mmol/L Final    ANION GAP 11/20/2018 7  4 - 13 mmol/L Final    BUN 11/20/2018 21  7 - 25 mg/dL Final    Creatinine 11/20/2018 0 65  0 60 - 1 20 mg/dL Final    Standardized to IDMS reference method    Glucose 11/20/2018 89  65 - 99 mg/dL Final      If the patient is fasting, the ADA then defines impaired fasting glucose as > 100 mg/dL and diabetes as > or equal to 123 mg/dL  Specimen collection should occur prior to Sulfasalazine administration due to the potential for falsely depressed results  Specimen collection should occur prior to Sulfapyridine administration due to the potential for falsely elevated results   Calcium 11/20/2018 8 1* 8 6 - 10 5 mg/dL Final    eGFR 11/20/2018 75  ml/min/1 73sq m Final       No results found

## 2018-11-27 NOTE — LETTER
November 27, 2018     No Recipients    Patient: Aggie Lomeli   YOB: 1921   Date of Visit: 11/27/2018       Dear Dr Ren Ruiz Recipients: Thank you for referring Lee Llamas to me for evaluation  Below are my notes for this consultation  If you have questions, please do not hesitate to call me  I look forward to following your patient along with you  Sincerely,        Jodie Alcala MD        CC: No Recipients  Jodie Alcala MD  11/27/2018  4:00 PM  Sign at close encounter  Assessment/Plan:    There are no diagnoses linked to this encounter  Subjective:      Patient ID: Aggie Lomeli is a 80 y o  female  HPI    The following portions of the patient's history were reviewed and updated as appropriate: allergies, current medications, past family history, past medical history, past social history, past surgical history and problem list       Current Outpatient Prescriptions:     gabapentin (NEURONTIN) 300 mg capsule, Take 1 capsule (300 mg total) by mouth 2 (two) times a day, Disp: 60 capsule, Rfl: 3    HYDROcodone-acetaminophen (NORCO) 7 5-325 mg per tablet, Take 1 tablet by mouth daily Earliest Fill Date: 10/10/18 Max Daily Amount: 1 tablet, Disp: 30 tablet, Rfl: 0    levothyroxine 100 mcg tablet, Take 1 tablet (100 mcg total) by mouth daily, Disp: 90 tablet, Rfl: 3    Past Medical History:   Diagnosis Date    Hypothyroid        Past Surgical History:   Procedure Laterality Date    ANKLE SURGERY Left 11/06/2006    ORIF HIP FRACTURE Left 11/2006       Social History       Patient Active Problem List   Diagnosis    Hypercholesterolemia    Hypothyroid    S/p left hip fracture    Fracture dislocation of left ankle    Weakness generalized    Acute cystitis with hematuria    Sepsis ([address] )    Alopecia    Ambulatory dysfunction    UTI (urinary tract infection)           Review of Systems      Objective: There were no vitals taken for this visit           Physical Exam Admission on 11/18/2018, Discharged on 11/20/2018   Component Date Value Ref Range Status    TSH 3RD GENERATON 11/18/2018 2 290  0 450 - 5 330 uIU/mL Final    Using supplements with high doses of biotin 20 to more than 300 times greater than the adequate daily intake for adults of 30 mcg/day as established by the [place] [employer], can cause falsely depress results      WBC 11/18/2018 9 10  4 80 - 10 80 Thousand/uL Final    RBC 11/18/2018 3 61* 3 90 - 5 20 Million/uL Final    Hemoglobin 11/18/2018 11 6* 12 0 - 16 0 g/dL Final    Hematocrit 11/18/2018 34 8  34 8 - 46 1 % Final    MCV 11/18/2018 97  81 - 99 fL Final    MCH 11/18/2018 32 0  26 0 - 34 0 pg Final    MCHC 11/18/2018 33 2  31 0 - 37 0 g/dL Final    RDW 11/18/2018 17 8* 11 5 - 14 5 % Final    MPV 11/18/2018 9 1  8 6 - 11 7 fL Final    Platelets 49/64/6178 331  149 - 390 Thousands/uL Final    nRBC 11/18/2018 0  /100 WBCs Final    Neutrophils Relative 11/18/2018 85* 42 - 75 % Final    Lymphocytes Relative 11/18/2018 6* 21 - 51 % Final    Monocytes Relative 11/18/2018 8  2 - 12 % Final    Eosinophils Relative 11/18/2018 1  0 - 5 % Final    Basophils Relative 11/18/2018 1  0 - 2 % Final    Neutrophils Absolute 11/18/2018 7 70* 1 40 - 6 50 Thousands/µL Final    Lymphocytes Absolute 11/18/2018 0 50* 0 60 - 4 47 Thousands/µL Final    Monocytes Absolute 11/18/2018 0 70  0 17 - 1 22 Thousand/µL Final    Eosinophils Absolute 11/18/2018 0 00  0 00 - 0 61 Thousand/µL Final    Basophils Absolute 11/18/2018 0 10  0 00 - 0 10 Thousands/µL Final    Sed Rate 11/18/2018 92* 0 - 30 mm/hour Final    Color, UA 11/18/2018 Yellow  Yellow, Straw Final    Clarity, UA 11/18/2018 Slightly Cloudy* Hazy, Clear Final    Specific Gravity, UA 11/18/2018 1 025  >1 005-<1 030 Final    pH, UA 11/18/2018 6 0  5 0-<8 0 Final    Leukocytes, UA 11/18/2018 Trace* Negative Final    Nitrite, UA 11/18/2018 Positive* Negative Final    Protein, UA 11/18/2018 1+* Negative, Interference- unable to analyze mg/dl Final    Glucose, UA 11/18/2018 Negative  Negative mg/dl Final    Ketones, UA 11/18/2018 Negative  Negative mg/dl Final    Urobilinogen, UA 11/18/2018 1 0  0 2, 1 0 E U /dl E U /dl Final    Bilirubin, UA 11/18/2018 Negative  Negative Final    Blood, UA 11/18/2018 1+* Negative Final    Sodium 11/18/2018 138  134 - 143 mmol/L Final    Potassium 11/18/2018 4 2  3 5 - 5 5 mmol/L Final    Chloride 11/18/2018 101  98 - 107 mmol/L Final    CO2 11/18/2018 30  21 - 31 mmol/L Final    ANION GAP 11/18/2018 7  4 - 13 mmol/L Final    BUN 11/18/2018 13  7 - 25 mg/dL Final    Creatinine 11/18/2018 0 61  0 60 - 1 20 mg/dL Final    Standardized to IDMS reference method    Glucose 11/18/2018 123* 65 - 99 mg/dL Final      If the patient is fasting, the ADA then defines impaired fasting glucose as > 100 mg/dL and diabetes as > or equal to 123 mg/dL  Specimen collection should occur prior to Sulfasalazine administration due to the potential for falsely depressed results  Specimen collection should occur prior to Sulfapyridine administration due to the potential for falsely elevated results   Calcium 11/18/2018 8 8  8 6 - 10 5 mg/dL Final    AST 11/18/2018 17  13 - 39 U/L Final      Specimen collection should occur prior to Sulfasalazine administration due to the potential for falsely depressed results   ALT 11/18/2018 10  7 - 52 U/L Final      Specimen collection should occur prior to Sulfasalazine administration due to the potential for falsely depressed results       Alkaline Phosphatase 11/18/2018 53* 55 - 165 U/L Final    Total Protein 11/18/2018 6 9  6 4 - 8 9 g/dL Final    Albumin 11/18/2018 3 2* 3 5 - 5 7 g/dL Final    Total Bilirubin 11/18/2018 0 90  0 20 - 1 00 mg/dL Final    eGFR 11/18/2018 77  ml/min/1 73sq m Final    Magnesium 11/18/2018 2 1  1 9 - 2 7 mg/dL Final    Lipase 11/18/2018 16  11 - 82 u/L Final    Total CK 11/18/2018 22* 30 - 192 U/L Final    RBC, UA 11/18/2018 1-2* None Seen, 0-5 /hpf Final    WBC, UA 11/18/2018 20-30* None Seen, 0-5, 5-55, 5-65 /hpf Final    Epithelial Cells 11/18/2018 Occasional  None Seen, Occasional /hpf Final    Bacteria, UA 11/18/2018 Innumerable* None Seen, Occasional /hpf Final    Ventricular Rate 11/18/2018 88  BPM Final    Atrial Rate 11/18/2018 88  BPM Final    MA Interval 11/18/2018 136  ms Final    QRSD Interval 11/18/2018 96  ms Final    QT Interval 11/18/2018 372  ms Final    QTC Interval 11/18/2018 450  ms Final    P Axis 11/18/2018 42  degrees Final    QRS Axis 11/18/2018 -36  degrees Final    T Wave [place] 11/18/2018 74  degrees Final    Urine Culture 11/18/2018 >100,000 cfu/ml Escherichia coli*  Final    Troponin I 11/18/2018 <0 03  <=0 03 ng/mL Final    TSH 3RD GENERATON 11/18/2018 1 590  0 450 - 5 330 uIU/mL Final    Using supplements with high doses of biotin 20 to more than 300 times greater than the adequate daily intake for adults of 30 mcg/day as established by the [place] [employer], can cause falsely depress results   Troponin I 11/18/2018 <0 03  <=0 03 ng/mL Final    Platelets 11/84/6583 321  149 - 390 Thousands/uL Final    Troponin I 11/18/2018 <0 03  <=0 03 ng/mL Final    Sodium 11/19/2018 139  134 - 143 mmol/L Final    Potassium 11/19/2018 4 0  3 5 - 5 5 mmol/L Final    Chloride 11/19/2018 106  98 - 107 mmol/L Final    CO2 11/19/2018 26  21 - 31 mmol/L Final    ANION GAP 11/19/2018 7  4 - 13 mmol/L Final    BUN 11/19/2018 17  7 - 25 mg/dL Final    Creatinine 11/19/2018 0 58* 0 60 - 1 20 mg/dL Final    Standardized to IDMS reference method    Glucose 11/19/2018 140* 65 - 99 mg/dL Final      If the patient is fasting, the ADA then defines impaired fasting glucose as > 100 mg/dL and diabetes as > or equal to 123 mg/dL  Specimen collection should occur prior to Sulfasalazine administration due to the potential for falsely depressed results   Specimen collection should occur prior to Sulfapyridine administration due to the potential for falsely elevated results   Calcium 11/19/2018 8 4* 8 6 - 10 5 mg/dL Final    AST 11/19/2018 14  13 - 39 U/L Final      Specimen collection should occur prior to Sulfasalazine administration due to the potential for falsely depressed results   ALT 11/19/2018 9  7 - 52 U/L Final      Specimen collection should occur prior to Sulfasalazine administration due to the potential for falsely depressed results       Alkaline Phosphatase 11/19/2018 48* 55 - 165 U/L Final    Total Protein 11/19/2018 6 3* 6 4 - 8 9 g/dL Final    Albumin 11/19/2018 2 9* 3 5 - 5 7 g/dL Final    Total Bilirubin 11/19/2018 0 50  0 20 - 1 00 mg/dL Final    eGFR 11/19/2018 78  ml/min/1 73sq m Final    Magnesium 11/19/2018 2 2  1 9 - 2 7 mg/dL Final    WBC 11/19/2018 6 30  4 80 - 10 80 Thousand/uL Final    RBC 11/19/2018 3 50* 3 90 - 5 20 Million/uL Final    Hemoglobin 11/19/2018 10 9* 12 0 - 16 0 g/dL Final    Hematocrit 11/19/2018 34 1* 34 8 - 46 1 % Final    MCV 11/19/2018 97  81 - 99 fL Final    MCH 11/19/2018 31 2  26 0 - 34 0 pg Final    MCHC 11/19/2018 32 0  31 0 - 37 0 g/dL Final    RDW 11/19/2018 17 9* 11 5 - 14 5 % Final    MPV 11/19/2018 9 4  8 6 - 11 7 fL Final    Platelets 15/98/5090 330  149 - 390 Thousands/uL Final    nRBC 11/19/2018 0  /100 WBCs Final    Neutrophils Relative 11/19/2018 83* 42 - 75 % Final    Lymphocytes Relative 11/19/2018 11* 21 - 51 % Final    Monocytes Relative 11/19/2018 6  2 - 12 % Final    Eosinophils Relative 11/19/2018 0  0 - 5 % Final    Basophils Relative 11/19/2018 0  0 - 2 % Final    Neutrophils Absolute 11/19/2018 5 20  1 40 - 6 50 Thousands/µL Final    Lymphocytes Absolute 11/19/2018 0 70  0 60 - 4 47 Thousands/µL Final    Monocytes Absolute 11/19/2018 0 40  0 17 - 1 22 Thousand/µL Final    Eosinophils Absolute 11/19/2018 0 00  0 00 - 0 61 Thousand/µL Final    Basophils Absolute 11/19/2018 0 00  0 00 - 0 10 Thousands/µL Final    WBC 11/20/2018 9 50  4 80 - 10 80 Thousand/uL Final    RBC 11/20/2018 3 41* 3 90 - 5 20 Million/uL Final    Hemoglobin 11/20/2018 10 7* 12 0 - 16 0 g/dL Final    Hematocrit 11/20/2018 33 4* 34 8 - 46 1 % Final    MCV 11/20/2018 98  81 - 99 fL Final    MCH 11/20/2018 31 5  26 0 - 34 0 pg Final    MCHC 11/20/2018 32 2  31 0 - 37 0 g/dL Final    RDW 11/20/2018 17 4* 11 5 - 14 5 % Final    MPV 11/20/2018 9 4  8 6 - 11 7 fL Final    Platelets 31/14/7084 350  149 - 390 Thousands/uL Final    nRBC 11/20/2018 0  /100 WBCs Final    Neutrophils Relative 11/20/2018 75  42 - 75 % Final    Lymphocytes Relative 11/20/2018 15* 21 - 51 % Final    Monocytes Relative 11/20/2018 6  2 - 12 % Final    Eosinophils Relative 11/20/2018 5  0 - 5 % Final    Basophils Relative 11/20/2018 1  0 - 2 % Final    Neutrophils Absolute 11/20/2018 7 10* 1 40 - 6 50 Thousands/µL Final    Lymphocytes Absolute 11/20/2018 1 40  0 60 - 4 47 Thousands/µL Final    Monocytes Absolute 11/20/2018 0 50  0 17 - 1 22 Thousand/µL Final    Eosinophils Absolute 11/20/2018 0 40  0 00 - 0 61 Thousand/µL Final    Basophils Absolute 11/20/2018 0 00  0 00 - 0 10 Thousands/µL Final    Sodium 11/20/2018 142  134 - 143 mmol/L Final    Potassium 11/20/2018 3 8  3 5 - 5 5 mmol/L Final    Chloride 11/20/2018 107  98 - 107 mmol/L Final    CO2 11/20/2018 28  21 - 31 mmol/L Final    ANION GAP 11/20/2018 7  4 - 13 mmol/L Final    BUN 11/20/2018 21  7 - 25 mg/dL Final    Creatinine 11/20/2018 0 65  0 60 - 1 20 mg/dL Final    Standardized to IDMS reference method    Glucose 11/20/2018 89  65 - 99 mg/dL Final      If the patient is fasting, the ADA then defines impaired fasting glucose as > 100 mg/dL and diabetes as > or equal to 123 mg/dL  Specimen collection should occur prior to Sulfasalazine administration due to the potential for falsely depressed results   Specimen collection should occur prior to Sulfapyridine administration due to the potential for falsely elevated results   Calcium 11/20/2018 8 1* 8 6 - 10 5 mg/dL Final    eGFR 11/20/2018 75  ml/min/1 73sq m Final       No results found

## 2018-12-03 ENCOUNTER — TELEPHONE (OUTPATIENT)
Dept: FAMILY MEDICINE CLINIC | Facility: CLINIC | Age: 83
End: 2018-12-03

## 2018-12-03 ENCOUNTER — PATIENT OUTREACH (OUTPATIENT)
Dept: OTHER | Facility: HOSPITAL | Age: 83
End: 2018-12-03

## 2018-12-03 DIAGNOSIS — R52 PAIN: Primary | ICD-10-CM

## 2018-12-03 DIAGNOSIS — F41.9 ANXIETY: ICD-10-CM

## 2018-12-03 NOTE — TELEPHONE ENCOUNTER
[facility] 57 nurse attempted to get in touch with Dr Alexia Mo over the weekend regarding the patient, they were unable to get a reply from him and felt the related issues couldn't wait for the office to reopen Monday morning  Isidro Padilla was contacted in order to get a verbal ok for Hospice  The patient is in need of a Hospice Eval and a pain management referral  Due to the situation, they are also requesting something for the patients anxiety  Isidro Padilla gave verbal ok for the Hospice Eval    This morning I placed an ambulatory referral to Pain management and am requesting Dr Alexia Mo to address the issue with the patients anxiety

## 2018-12-04 NOTE — TELEPHONE ENCOUNTER
Spoke with patients daughter who stated the rx for Xanax was sent last night and she picked it up at the pharmacy

## 2018-12-10 ENCOUNTER — LAB REQUISITION (OUTPATIENT)
Dept: LAB | Facility: HOSPITAL | Age: 83
End: 2018-12-10
Payer: MEDICARE

## 2018-12-10 DIAGNOSIS — Z87.81 PERSONAL HISTORY OF HEALED TRAUMATIC FRACTURE: ICD-10-CM

## 2018-12-10 LAB
ALBUMIN SERPL BCP-MCNC: 2.9 G/DL (ref 3.5–5.7)
ALP SERPL-CCNC: 68 U/L (ref 55–165)
ALT SERPL W P-5'-P-CCNC: 10 U/L (ref 7–52)
ANION GAP SERPL CALCULATED.3IONS-SCNC: 10 MMOL/L (ref 4–13)
AST SERPL W P-5'-P-CCNC: 21 U/L (ref 13–39)
BASOPHILS # BLD AUTO: 0.1 THOUSANDS/ΜL (ref 0–0.1)
BASOPHILS NFR BLD AUTO: 1 % (ref 0–1)
BILIRUB SERPL-MCNC: 0.8 MG/DL (ref 0.2–1)
BUN SERPL-MCNC: 21 MG/DL (ref 7–25)
CALCIUM SERPL-MCNC: 8.4 MG/DL (ref 8.6–10.5)
CHLORIDE SERPL-SCNC: 97 MMOL/L (ref 98–107)
CO2 SERPL-SCNC: 31 MMOL/L (ref 21–31)
CREAT SERPL-MCNC: 0.47 MG/DL (ref 0.6–1.2)
EOSINOPHIL # BLD AUTO: 0.2 THOUSAND/ΜL (ref 0–0.61)
EOSINOPHIL NFR BLD AUTO: 2 % (ref 0–6)
ERYTHROCYTE [DISTWIDTH] IN BLOOD BY AUTOMATED COUNT: 18.9 % (ref 11.6–15.1)
GFR SERPL CREATININE-BSD FRML MDRD: 84 ML/MIN/1.73SQ M
GLUCOSE SERPL-MCNC: 97 MG/DL (ref 65–140)
HCT VFR BLD AUTO: 38.2 % (ref 37–47)
HGB BLD-MCNC: 12.5 G/DL (ref 11.5–15.4)
LYMPHOCYTES # BLD AUTO: 1 THOUSANDS/ΜL (ref 0.6–4.47)
LYMPHOCYTES NFR BLD AUTO: 10 % (ref 14–44)
MCH RBC QN AUTO: 31.1 PG (ref 26.8–34.3)
MCHC RBC AUTO-ENTMCNC: 32.7 G/DL (ref 31.4–37.4)
MCV RBC AUTO: 95 FL (ref 82–98)
MONOCYTES # BLD AUTO: 0.7 THOUSAND/ΜL (ref 0.17–1.22)
MONOCYTES NFR BLD AUTO: 7 % (ref 4–12)
NEUTROPHILS # BLD AUTO: 8.3 THOUSANDS/ΜL (ref 1.85–7.62)
NEUTS SEG NFR BLD AUTO: 81 % (ref 43–75)
NRBC BLD AUTO-RTO: 0 /100 WBCS
PLATELET # BLD AUTO: 324 THOUSANDS/UL (ref 149–390)
PMV BLD AUTO: 9.9 FL (ref 8.9–12.7)
POTASSIUM SERPL-SCNC: 4.5 MMOL/L (ref 3.5–5.5)
PROT SERPL-MCNC: 6.1 G/DL (ref 6.4–8.9)
RBC # BLD AUTO: 4.01 MILLION/UL (ref 3.81–5.12)
SODIUM SERPL-SCNC: 138 MMOL/L (ref 134–143)
WBC # BLD AUTO: 10.2 THOUSAND/UL (ref 4.31–10.16)

## 2018-12-10 PROCEDURE — 85025 COMPLETE CBC W/AUTO DIFF WBC: CPT | Performed by: INTERNAL MEDICINE

## 2018-12-10 PROCEDURE — 80053 COMPREHEN METABOLIC PANEL: CPT | Performed by: INTERNAL MEDICINE

## 2018-12-13 ENCOUNTER — TELEPHONE (OUTPATIENT)
Dept: FAMILY MEDICINE CLINIC | Facility: CLINIC | Age: 83
End: 2018-12-13

## 2018-12-13 NOTE — TELEPHONE ENCOUNTER
Alis From [facility] called patient was reevaulated and now on [facility]

## 2018-12-13 NOTE — TELEPHONE ENCOUNTER
FYI Pt was re evaluated and is now on hospice  Dr Eneida Benites signed the ordered  Nothing needs to be done further

## 2019-02-18 ENCOUNTER — PATIENT OUTREACH (OUTPATIENT)
Dept: CASE MANAGEMENT | Facility: OTHER | Age: 84
End: 2019-02-18